# Patient Record
Sex: FEMALE | Race: WHITE | Employment: OTHER | URBAN - NONMETROPOLITAN AREA
[De-identification: names, ages, dates, MRNs, and addresses within clinical notes are randomized per-mention and may not be internally consistent; named-entity substitution may affect disease eponyms.]

---

## 2017-01-03 ENCOUNTER — TREATMENT (OUTPATIENT)
Dept: PHYSICAL THERAPY | Age: 62
End: 2017-01-03

## 2017-01-03 DIAGNOSIS — M17.12 PRIMARY OSTEOARTHRITIS OF ONE KNEE, LEFT: Primary | ICD-10-CM

## 2017-01-03 PROCEDURE — 97014 ELECTRIC STIMULATION THERAPY: CPT

## 2017-01-03 PROCEDURE — 97110 THERAPEUTIC EXERCISES: CPT

## 2017-01-05 ENCOUNTER — TREATMENT (OUTPATIENT)
Dept: PHYSICAL THERAPY | Age: 62
End: 2017-01-05

## 2017-01-05 DIAGNOSIS — M17.12 PRIMARY OSTEOARTHRITIS OF ONE KNEE, LEFT: Primary | ICD-10-CM

## 2017-01-05 PROCEDURE — 97014 ELECTRIC STIMULATION THERAPY: CPT | Performed by: PHYSICAL THERAPIST

## 2017-01-05 PROCEDURE — 97110 THERAPEUTIC EXERCISES: CPT | Performed by: PHYSICAL THERAPIST

## 2017-01-09 ENCOUNTER — TREATMENT (OUTPATIENT)
Dept: PHYSICAL THERAPY | Age: 62
End: 2017-01-09

## 2017-01-09 DIAGNOSIS — M17.12 PRIMARY OSTEOARTHRITIS OF ONE KNEE, LEFT: Primary | ICD-10-CM

## 2017-01-09 PROCEDURE — 97110 THERAPEUTIC EXERCISES: CPT

## 2017-01-09 PROCEDURE — 97014 ELECTRIC STIMULATION THERAPY: CPT

## 2017-01-12 ENCOUNTER — TREATMENT (OUTPATIENT)
Dept: PHYSICAL THERAPY | Age: 62
End: 2017-01-12

## 2017-01-12 DIAGNOSIS — M17.12 PRIMARY OSTEOARTHRITIS OF ONE KNEE, LEFT: Primary | ICD-10-CM

## 2017-01-12 PROCEDURE — 97014 ELECTRIC STIMULATION THERAPY: CPT

## 2017-01-12 PROCEDURE — 97110 THERAPEUTIC EXERCISES: CPT

## 2017-01-16 ENCOUNTER — TREATMENT (OUTPATIENT)
Dept: PHYSICAL THERAPY | Age: 62
End: 2017-01-16

## 2017-01-16 DIAGNOSIS — M17.12 PRIMARY OSTEOARTHRITIS OF ONE KNEE, LEFT: Primary | ICD-10-CM

## 2017-01-16 PROCEDURE — 97014 ELECTRIC STIMULATION THERAPY: CPT

## 2017-01-16 PROCEDURE — 97110 THERAPEUTIC EXERCISES: CPT

## 2017-01-19 ENCOUNTER — TREATMENT (OUTPATIENT)
Dept: PHYSICAL THERAPY | Age: 62
End: 2017-01-19

## 2017-01-19 DIAGNOSIS — M17.12 PRIMARY OSTEOARTHRITIS OF ONE KNEE, LEFT: Primary | ICD-10-CM

## 2017-01-19 PROCEDURE — 97014 ELECTRIC STIMULATION THERAPY: CPT | Performed by: PHYSICAL THERAPIST

## 2017-01-19 PROCEDURE — 97110 THERAPEUTIC EXERCISES: CPT | Performed by: PHYSICAL THERAPIST

## 2017-01-26 ENCOUNTER — OFFICE VISIT (OUTPATIENT)
Dept: OPHTHALMOLOGY | Age: 62
End: 2017-01-26

## 2017-01-26 DIAGNOSIS — H40.10X1 COAG (CHRONIC OPEN-ANGLE GLAUCOMA), MILD STAGE: Primary | ICD-10-CM

## 2017-01-26 PROCEDURE — 92083 EXTENDED VISUAL FIELD XM: CPT | Performed by: OPHTHALMOLOGY

## 2017-01-26 PROCEDURE — 92133 CPTRZD OPH DX IMG PST SGM ON: CPT | Performed by: OPHTHALMOLOGY

## 2017-01-26 RX ORDER — TROPICAMIDE 10 MG/ML
1 SOLUTION/ DROPS OPHTHALMIC ONCE
Status: COMPLETED | OUTPATIENT
Start: 2017-01-26 | End: 2017-01-26

## 2017-01-26 RX ORDER — PHENYLEPHRINE HCL 2.5 %
1 DROPS OPHTHALMIC (EYE) ONCE
Status: COMPLETED | OUTPATIENT
Start: 2017-01-26 | End: 2017-01-26

## 2017-01-26 RX ADMIN — Medication 1 DROP: at 15:38

## 2017-01-26 RX ADMIN — TROPICAMIDE 1 DROP: 10 SOLUTION/ DROPS OPHTHALMIC at 15:38

## 2017-02-09 ENCOUNTER — OFFICE VISIT (OUTPATIENT)
Dept: OPHTHALMOLOGY | Age: 62
End: 2017-02-09

## 2017-02-09 DIAGNOSIS — H40.1191 PRIMARY OPEN-ANGLE GLAUCOMA, MILD STAGE, UNSPECIFIED LATERALITY: Primary | ICD-10-CM

## 2017-02-09 PROCEDURE — 99214 OFFICE O/P EST MOD 30 MIN: CPT | Performed by: OPHTHALMOLOGY

## 2017-02-09 RX ORDER — TROPICAMIDE 10 MG/ML
1 SOLUTION/ DROPS OPHTHALMIC ONCE
Status: COMPLETED | OUTPATIENT
Start: 2017-02-09 | End: 2017-02-09

## 2017-02-09 RX ORDER — PHENYLEPHRINE HCL 2.5 %
1 DROPS OPHTHALMIC (EYE) ONCE
Status: COMPLETED | OUTPATIENT
Start: 2017-02-09 | End: 2017-02-09

## 2017-02-09 RX ORDER — BENOXINATE HCL/FLUORESCEIN SOD 0.4%-0.25%
1 DROPS OPHTHALMIC (EYE) ONCE
Status: COMPLETED | OUTPATIENT
Start: 2017-02-09 | End: 2017-02-09

## 2017-02-09 RX ADMIN — Medication 1 DROP: at 15:35

## 2017-02-09 RX ADMIN — TROPICAMIDE 1 DROP: 10 SOLUTION/ DROPS OPHTHALMIC at 15:35

## 2017-02-09 ASSESSMENT — SLIT LAMP EXAM - LIDS
COMMENTS: NORMAL
COMMENTS: NORMAL

## 2017-02-09 ASSESSMENT — TONOMETRY
OD_IOP_MMHG: 18
IOP_METHOD: APPLANATION W FLURESS DROP
OS_IOP_MMHG: 18

## 2017-02-09 ASSESSMENT — VISUAL ACUITY
METHOD: SNELLEN - LINEAR
OS_CC: 20/25
CORRECTION_TYPE: GLASSES

## 2017-02-09 ASSESSMENT — ENCOUNTER SYMPTOMS
RESPIRATORY NEGATIVE: 1
GASTROINTESTINAL NEGATIVE: 1

## 2017-03-08 ENCOUNTER — TELEPHONE (OUTPATIENT)
Dept: SURGERY | Age: 62
End: 2017-03-08

## 2017-03-10 ENCOUNTER — OFFICE VISIT (OUTPATIENT)
Dept: INTERNAL MEDICINE | Age: 62
End: 2017-03-10

## 2017-03-10 VITALS
WEIGHT: 229 LBS | SYSTOLIC BLOOD PRESSURE: 118 MMHG | HEART RATE: 72 BPM | DIASTOLIC BLOOD PRESSURE: 70 MMHG | BODY MASS INDEX: 33.92 KG/M2 | HEIGHT: 69 IN

## 2017-03-10 DIAGNOSIS — I34.0 MITRAL VALVE INSUFFICIENCY, UNSPECIFIED ETIOLOGY: ICD-10-CM

## 2017-03-10 DIAGNOSIS — M54.50 CHRONIC RIGHT-SIDED LOW BACK PAIN WITHOUT SCIATICA: ICD-10-CM

## 2017-03-10 DIAGNOSIS — I10 ESSENTIAL HYPERTENSION: Primary | ICD-10-CM

## 2017-03-10 DIAGNOSIS — G89.29 CHRONIC RIGHT-SIDED LOW BACK PAIN WITHOUT SCIATICA: ICD-10-CM

## 2017-03-10 DIAGNOSIS — M17.12 PRIMARY OSTEOARTHRITIS OF ONE KNEE, LEFT: ICD-10-CM

## 2017-03-10 DIAGNOSIS — R73.01 IFG (IMPAIRED FASTING GLUCOSE): ICD-10-CM

## 2017-03-10 DIAGNOSIS — E78.5 DYSLIPIDEMIA: ICD-10-CM

## 2017-03-10 DIAGNOSIS — I82.409 DEEP VEIN THROMBOSIS (DVT) OF LOWER EXTREMITY, UNSPECIFIED CHRONICITY, UNSPECIFIED LATERALITY, UNSPECIFIED VEIN (HCC): ICD-10-CM

## 2017-03-10 PROCEDURE — 99214 OFFICE O/P EST MOD 30 MIN: CPT | Performed by: INTERNAL MEDICINE

## 2017-03-10 RX ORDER — CELECOXIB 200 MG/1
200 CAPSULE ORAL DAILY PRN
COMMUNITY
Start: 2017-01-17 | End: 2018-01-04

## 2017-03-12 ASSESSMENT — ENCOUNTER SYMPTOMS
ABDOMINAL PAIN: 0
BACK PAIN: 1
EYE PAIN: 0
SHORTNESS OF BREATH: 0
SORE THROAT: 0
DIARRHEA: 0
COUGH: 0
EYE DISCHARGE: 0
VOMITING: 0
BLURRED VISION: 0
DOUBLE VISION: 0
WHEEZING: 0
CONSTIPATION: 0
BLOOD IN STOOL: 0
NAUSEA: 0

## 2017-05-15 ENCOUNTER — OFFICE VISIT (OUTPATIENT)
Dept: INTERNAL MEDICINE | Age: 62
End: 2017-05-15
Payer: COMMERCIAL

## 2017-05-15 DIAGNOSIS — Z01.818 PRE-OP EVALUATION: ICD-10-CM

## 2017-05-15 DIAGNOSIS — I10 ESSENTIAL HYPERTENSION: ICD-10-CM

## 2017-05-15 DIAGNOSIS — I82.409 DEEP VEIN THROMBOSIS (DVT) OF LOWER EXTREMITY, UNSPECIFIED CHRONICITY, UNSPECIFIED LATERALITY, UNSPECIFIED VEIN (HCC): ICD-10-CM

## 2017-05-15 DIAGNOSIS — S83.209A MENISCUS TEAR: ICD-10-CM

## 2017-05-15 DIAGNOSIS — M17.12 PRIMARY OSTEOARTHRITIS OF ONE KNEE, LEFT: Primary | ICD-10-CM

## 2017-05-15 DIAGNOSIS — K21.9 GASTROESOPHAGEAL REFLUX DISEASE WITHOUT ESOPHAGITIS: ICD-10-CM

## 2017-05-15 DIAGNOSIS — E78.5 DYSLIPIDEMIA: ICD-10-CM

## 2017-05-15 DIAGNOSIS — R73.01 IFG (IMPAIRED FASTING GLUCOSE): ICD-10-CM

## 2017-05-15 PROCEDURE — 99214 OFFICE O/P EST MOD 30 MIN: CPT | Performed by: NURSE PRACTITIONER

## 2017-05-15 PROCEDURE — 93000 ELECTROCARDIOGRAM COMPLETE: CPT | Performed by: NURSE PRACTITIONER

## 2017-05-17 VITALS
OXYGEN SATURATION: 100 % | WEIGHT: 225 LBS | DIASTOLIC BLOOD PRESSURE: 80 MMHG | HEIGHT: 69 IN | TEMPERATURE: 98.6 F | RESPIRATION RATE: 16 BRPM | HEART RATE: 90 BPM | SYSTOLIC BLOOD PRESSURE: 126 MMHG | BODY MASS INDEX: 33.33 KG/M2

## 2017-05-18 ASSESSMENT — ENCOUNTER SYMPTOMS
BLOOD IN STOOL: 0
NAUSEA: 0
SORE THROAT: 0
TROUBLE SWALLOWING: 0
DIARRHEA: 0
EYE PAIN: 0
COUGH: 0
RHINORRHEA: 0
COLOR CHANGE: 0
VOMITING: 0
CHEST TIGHTNESS: 0
SINUS PRESSURE: 0
CONSTIPATION: 0
SHORTNESS OF BREATH: 0
ABDOMINAL PAIN: 0

## 2017-06-09 ENCOUNTER — OFFICE VISIT (OUTPATIENT)
Dept: OPHTHALMOLOGY | Age: 62
End: 2017-06-09
Payer: COMMERCIAL

## 2017-06-09 DIAGNOSIS — H40.1191 PRIMARY OPEN-ANGLE GLAUCOMA, MILD STAGE, UNSPECIFIED LATERALITY: Primary | ICD-10-CM

## 2017-06-09 PROCEDURE — 99213 OFFICE O/P EST LOW 20 MIN: CPT | Performed by: OPHTHALMOLOGY

## 2017-06-09 RX ORDER — BENOXINATE HCL/FLUORESCEIN SOD 0.4%-0.25%
1 DROPS OPHTHALMIC (EYE) ONCE
Status: COMPLETED | OUTPATIENT
Start: 2017-06-09 | End: 2017-06-09

## 2017-06-09 RX ORDER — LATANOPROST 50 UG/ML
1 SOLUTION/ DROPS OPHTHALMIC
Qty: 3 BOTTLE | Refills: 3 | Status: CANCELLED | OUTPATIENT
Start: 2017-06-09 | End: 2017-09-07

## 2017-06-09 RX ADMIN — Medication 1 DROP: at 15:47

## 2017-06-09 ASSESSMENT — ENCOUNTER SYMPTOMS
RESPIRATORY NEGATIVE: 1
GASTROINTESTINAL NEGATIVE: 1

## 2017-06-09 ASSESSMENT — VISUAL ACUITY
CORRECTION_TYPE: GLASSES
OS_CC: 20/20
METHOD: SNELLEN - LINEAR

## 2017-06-09 ASSESSMENT — SLIT LAMP EXAM - LIDS
COMMENTS: NORMAL
COMMENTS: NORMAL

## 2017-06-09 ASSESSMENT — TONOMETRY
OD_IOP_MMHG: 26
OS_IOP_MMHG: 24
IOP_METHOD: APPLANATION W FLURESS DROP

## 2017-06-12 ENCOUNTER — OFFICE VISIT (OUTPATIENT)
Dept: OPHTHALMOLOGY | Age: 62
End: 2017-06-12
Payer: COMMERCIAL

## 2017-06-12 DIAGNOSIS — H40.1191 PRIMARY OPEN-ANGLE GLAUCOMA, MILD STAGE, UNSPECIFIED LATERALITY: Primary | ICD-10-CM

## 2017-06-12 PROCEDURE — 99213 OFFICE O/P EST LOW 20 MIN: CPT | Performed by: OPHTHALMOLOGY

## 2017-06-12 RX ORDER — BENOXINATE HCL/FLUORESCEIN SOD 0.4%-0.25%
1 DROPS OPHTHALMIC (EYE) ONCE
Status: COMPLETED | OUTPATIENT
Start: 2017-06-12 | End: 2017-06-12

## 2017-06-12 RX ORDER — LATANOPROST 50 UG/ML
1 SOLUTION/ DROPS OPHTHALMIC NIGHTLY
Qty: 3 BOTTLE | Refills: 3 | Status: SHIPPED | OUTPATIENT
Start: 2017-06-12 | End: 2018-06-18

## 2017-06-12 RX ADMIN — Medication 1 DROP: at 16:35

## 2017-06-12 ASSESSMENT — ENCOUNTER SYMPTOMS
GASTROINTESTINAL NEGATIVE: 1
RESPIRATORY NEGATIVE: 1

## 2017-06-12 ASSESSMENT — SLIT LAMP EXAM - LIDS
COMMENTS: NORMAL
COMMENTS: NORMAL

## 2017-06-12 ASSESSMENT — TONOMETRY
OS_IOP_MMHG: 18
IOP_METHOD: APPLANATION W FLURESS DROP
OD_IOP_MMHG: 18

## 2017-06-12 ASSESSMENT — VISUAL ACUITY
CORRECTION_TYPE: GLASSES
OS_CC: 20/20
METHOD: SNELLEN - LINEAR

## 2017-07-03 ENCOUNTER — TELEPHONE (OUTPATIENT)
Dept: GENERAL RADIOLOGY | Age: 62
End: 2017-07-03

## 2017-07-05 DIAGNOSIS — Z12.31 SCREENING MAMMOGRAM, ENCOUNTER FOR: Primary | ICD-10-CM

## 2017-07-11 ENCOUNTER — HOSPITAL ENCOUNTER (OUTPATIENT)
Age: 62
Setting detail: SPECIMEN
Discharge: HOME | End: 2017-07-11
Payer: COMMERCIAL

## 2017-07-11 ENCOUNTER — OFFICE VISIT (OUTPATIENT)
Dept: OBGYN | Age: 62
End: 2017-07-11
Payer: COMMERCIAL

## 2017-07-11 VITALS
BODY MASS INDEX: 33.36 KG/M2 | HEART RATE: 100 BPM | WEIGHT: 225.2 LBS | HEIGHT: 69 IN | DIASTOLIC BLOOD PRESSURE: 100 MMHG | SYSTOLIC BLOOD PRESSURE: 152 MMHG

## 2017-07-11 DIAGNOSIS — Z12.72 VAGINAL PAP SMEAR: ICD-10-CM

## 2017-07-11 DIAGNOSIS — Z01.419 WELL FEMALE EXAM WITH ROUTINE GYNECOLOGICAL EXAM: Primary | ICD-10-CM

## 2017-07-11 DIAGNOSIS — Z12.31 SCREENING MAMMOGRAM, ENCOUNTER FOR: ICD-10-CM

## 2017-07-11 PROCEDURE — 99396 PREV VISIT EST AGE 40-64: CPT | Performed by: NURSE PRACTITIONER

## 2017-07-17 LAB — CYTOLOGY REPORT: NORMAL

## 2017-09-05 ENCOUNTER — HOSPITAL ENCOUNTER (OUTPATIENT)
Dept: LAB | Age: 62
Discharge: HOME | End: 2017-09-05
Payer: COMMERCIAL

## 2017-09-05 DIAGNOSIS — I10 ESSENTIAL HYPERTENSION: ICD-10-CM

## 2017-09-05 LAB
ANION GAP SERPL CALCULATED.3IONS-SCNC: 11 MMOL/L (ref 9–17)
BUN BLDV-MCNC: 17 MG/DL (ref 8–23)
BUN/CREAT BLD: 20 (ref 9–20)
CALCIUM SERPL-MCNC: 9 MG/DL (ref 8.6–10.4)
CHLORIDE BLD-SCNC: 105 MMOL/L (ref 98–107)
CO2: 26 MMOL/L (ref 20–31)
CREAT SERPL-MCNC: 0.85 MG/DL (ref 0.5–0.9)
GFR AFRICAN AMERICAN: >60 ML/MIN
GFR NON-AFRICAN AMERICAN: >60 ML/MIN
GFR SERPL CREATININE-BSD FRML MDRD: ABNORMAL ML/MIN/{1.73_M2}
GFR SERPL CREATININE-BSD FRML MDRD: ABNORMAL ML/MIN/{1.73_M2}
GLUCOSE BLD-MCNC: 105 MG/DL (ref 70–99)
POTASSIUM SERPL-SCNC: 4.2 MMOL/L (ref 3.7–5.3)
SODIUM BLD-SCNC: 142 MMOL/L (ref 135–144)

## 2017-09-05 PROCEDURE — 80048 BASIC METABOLIC PNL TOTAL CA: CPT

## 2017-09-05 PROCEDURE — 36415 COLL VENOUS BLD VENIPUNCTURE: CPT

## 2017-09-13 ENCOUNTER — HOSPITAL ENCOUNTER (OUTPATIENT)
Dept: LAB | Age: 62
Discharge: HOME | End: 2017-09-13
Payer: COMMERCIAL

## 2017-09-13 ENCOUNTER — OFFICE VISIT (OUTPATIENT)
Dept: INTERNAL MEDICINE | Age: 62
End: 2017-09-13
Payer: COMMERCIAL

## 2017-09-13 VITALS
SYSTOLIC BLOOD PRESSURE: 126 MMHG | HEIGHT: 69 IN | DIASTOLIC BLOOD PRESSURE: 68 MMHG | HEART RATE: 72 BPM | WEIGHT: 229 LBS | BODY MASS INDEX: 33.92 KG/M2

## 2017-09-13 DIAGNOSIS — R73.01 IFG (IMPAIRED FASTING GLUCOSE): ICD-10-CM

## 2017-09-13 DIAGNOSIS — M17.12 PRIMARY OSTEOARTHRITIS OF ONE KNEE, LEFT: ICD-10-CM

## 2017-09-13 DIAGNOSIS — N20.0 KIDNEY STONE: ICD-10-CM

## 2017-09-13 DIAGNOSIS — K21.00 GASTROESOPHAGEAL REFLUX DISEASE WITH ESOPHAGITIS: ICD-10-CM

## 2017-09-13 DIAGNOSIS — I10 ESSENTIAL HYPERTENSION: Primary | ICD-10-CM

## 2017-09-13 DIAGNOSIS — E78.5 DYSLIPIDEMIA: ICD-10-CM

## 2017-09-13 DIAGNOSIS — E66.9 OBESITY (BMI 30-39.9): ICD-10-CM

## 2017-09-13 DIAGNOSIS — I82.409 DEEP VEIN THROMBOSIS (DVT) OF LOWER EXTREMITY, UNSPECIFIED CHRONICITY, UNSPECIFIED LATERALITY, UNSPECIFIED VEIN (HCC): ICD-10-CM

## 2017-09-13 DIAGNOSIS — I34.0 MITRAL VALVE INSUFFICIENCY, UNSPECIFIED ETIOLOGY: ICD-10-CM

## 2017-09-13 LAB
-: ABNORMAL
AMORPHOUS: ABNORMAL
BACTERIA: ABNORMAL
BILIRUBIN URINE: NEGATIVE
CASTS UA: ABNORMAL /LPF (ref 0–2)
COLOR: ABNORMAL
COMMENT UA: ABNORMAL
CRYSTALS, UA: ABNORMAL /HPF
EPITHELIAL CELLS UA: ABNORMAL /HPF (ref 0–5)
GLUCOSE URINE: NEGATIVE
KETONES, URINE: NEGATIVE
LEUKOCYTE ESTERASE, URINE: NEGATIVE
MUCUS: ABNORMAL
NITRITE, URINE: NEGATIVE
OTHER OBSERVATIONS UA: ABNORMAL
PH UA: 6 (ref 5–6)
PROTEIN UA: NEGATIVE
RBC UA: ABNORMAL /HPF (ref 0–4)
RENAL EPITHELIAL, UA: ABNORMAL /HPF
SPECIFIC GRAVITY UA: 1.02 (ref 1.01–1.02)
TRICHOMONAS: ABNORMAL
TURBIDITY: ABNORMAL
URINE HGB: ABNORMAL
UROBILINOGEN, URINE: NORMAL
WBC UA: ABNORMAL /HPF (ref 0–4)
YEAST: ABNORMAL

## 2017-09-13 PROCEDURE — 81001 URINALYSIS AUTO W/SCOPE: CPT

## 2017-09-13 PROCEDURE — 99214 OFFICE O/P EST MOD 30 MIN: CPT | Performed by: INTERNAL MEDICINE

## 2017-09-13 ASSESSMENT — PATIENT HEALTH QUESTIONNAIRE - PHQ9
SUM OF ALL RESPONSES TO PHQ9 QUESTIONS 1 & 2: 0
1. LITTLE INTEREST OR PLEASURE IN DOING THINGS: 0
SUM OF ALL RESPONSES TO PHQ QUESTIONS 1-9: 0
2. FEELING DOWN, DEPRESSED OR HOPELESS: 0

## 2017-09-14 ENCOUNTER — HOSPITAL ENCOUNTER (OUTPATIENT)
Dept: ULTRASOUND IMAGING | Age: 62
Discharge: HOME | End: 2017-09-14
Payer: COMMERCIAL

## 2017-09-14 DIAGNOSIS — N20.0 KIDNEY STONE: ICD-10-CM

## 2017-09-14 PROCEDURE — 76775 US EXAM ABDO BACK WALL LIM: CPT

## 2017-10-13 ENCOUNTER — OFFICE VISIT (OUTPATIENT)
Dept: OPHTHALMOLOGY | Age: 62
End: 2017-10-13
Payer: COMMERCIAL

## 2017-10-13 DIAGNOSIS — H40.9 MILD STAGE GLAUCOMA(365.71): Primary | ICD-10-CM

## 2017-10-13 PROCEDURE — 99213 OFFICE O/P EST LOW 20 MIN: CPT | Performed by: OPHTHALMOLOGY

## 2017-10-13 RX ORDER — BENOXINATE HCL/FLUORESCEIN SOD 0.4%-0.25%
1 DROPS OPHTHALMIC (EYE) ONCE
Status: COMPLETED | OUTPATIENT
Start: 2017-10-13 | End: 2017-10-13

## 2017-10-13 RX ADMIN — Medication 1 DROP: at 14:43

## 2017-10-13 ASSESSMENT — VISUAL ACUITY
CORRECTION_TYPE: GLASSES
OS_CC: 20/20
METHOD: SNELLEN - LINEAR

## 2017-10-13 ASSESSMENT — TONOMETRY
OS_IOP_MMHG: 20
OD_IOP_MMHG: 20
IOP_METHOD: APPLANATION W FLURESS DROP

## 2017-10-13 ASSESSMENT — ENCOUNTER SYMPTOMS
GASTROINTESTINAL NEGATIVE: 1
RESPIRATORY NEGATIVE: 1

## 2017-10-13 ASSESSMENT — SLIT LAMP EXAM - LIDS
COMMENTS: NORMAL
COMMENTS: NORMAL

## 2017-10-13 NOTE — PATIENT INSTRUCTIONS
Take medication as prescribed. If you have any problems or concerns before your next scheduled appointment, please call the office at [phone].

## 2017-10-13 NOTE — PROGRESS NOTES
Kade Li presents today for an IOP check   Chief Complaint   Patient presents with    Follow-up    Glaucoma   . HPI     Glaucoma   In both eyes. Side effects of treatment include none. Treatment compliance is always. Comments   4 month IOP ck  Latanoprost 1 drop OU at bedtime,  Last drop OU last night  No problems with drop  No changes in health status since last appointment with me. Having no problems with any other organ system. I have reviewed the HPI I agree and will use it for the pt. HPI. Review of Systems  Review of Systems   Constitutional: Negative. HENT: Negative. Respiratory: Negative. Cardiovascular: Negative. Gastrointestinal: Negative. Musculoskeletal: Negative. Skin: Negative. Neurological: Negative. Endo/Heme/Allergies: Negative. Main Ophthalmology Exam     External Exam       Right Left    External Normal Normal          Slit Lamp Exam       Right Left    Lids/Lashes Normal Normal    Conjunctiva/Sclera White and quiet White and quiet    Cornea Clear Clear    Anterior Chamber Deep and quiet Deep and quiet    Iris Round and reactive Round and reactive    Lens Trace Nuclear sclerosis, Trace Cortical cataract Trace Nuclear sclerosis, Trace Cortical cataract    Vitreous Normal Normal                   Tonometry     Tonometry (Applanation w Fluress drop, 2:38 PM)       Right Left    Pressure 20 20              Visual Acuity     Visual Acuity (Snellen - Linear)       Right Left    Dist cc 20/20 20/20    Correction:  Glasses              Pupils     Pupils       Pupils React APD    Right PERRL Slow None    Left PERRL Slow None                Not recorded         Extraocular Movement     Extraocular Movement       Right Left     Full, Ortho Full, Ortho                IMPRESSION:  1. Mild stage glaucoma         PLAN:  Discussed all below with patient.     1. Mild stage glaucoma  Lumigan 0.01% 1 gtt OU hs       Patient Instructions   Take medication as prescribed. If you have any problems or concerns before your next scheduled appointment, please call the office at [phone]. Return in about 4 months (around 2/13/2018) for dilation & repeat OCT,VF.

## 2017-11-09 ENCOUNTER — HOSPITAL ENCOUNTER (OUTPATIENT)
Dept: LAB | Age: 62
Setting detail: SPECIMEN
Discharge: HOME | End: 2017-11-09
Payer: COMMERCIAL

## 2017-11-09 ENCOUNTER — OFFICE VISIT (OUTPATIENT)
Dept: INTERNAL MEDICINE | Age: 62
End: 2017-11-09
Payer: COMMERCIAL

## 2017-11-09 VITALS
BODY MASS INDEX: 34.42 KG/M2 | HEIGHT: 69 IN | TEMPERATURE: 97.8 F | DIASTOLIC BLOOD PRESSURE: 86 MMHG | OXYGEN SATURATION: 99 % | WEIGHT: 232.4 LBS | HEART RATE: 86 BPM | RESPIRATION RATE: 16 BRPM | SYSTOLIC BLOOD PRESSURE: 132 MMHG

## 2017-11-09 DIAGNOSIS — Z01.818 PRE-OP EVALUATION: ICD-10-CM

## 2017-11-09 DIAGNOSIS — R73.01 IFG (IMPAIRED FASTING GLUCOSE): ICD-10-CM

## 2017-11-09 DIAGNOSIS — K21.00 GASTROESOPHAGEAL REFLUX DISEASE WITH ESOPHAGITIS: ICD-10-CM

## 2017-11-09 DIAGNOSIS — I34.0 MITRAL VALVE INSUFFICIENCY, UNSPECIFIED ETIOLOGY: ICD-10-CM

## 2017-11-09 DIAGNOSIS — I82.409 DEEP VEIN THROMBOSIS (DVT) OF LOWER EXTREMITY, UNSPECIFIED CHRONICITY, UNSPECIFIED LATERALITY, UNSPECIFIED VEIN (HCC): ICD-10-CM

## 2017-11-09 DIAGNOSIS — M17.12 PRIMARY OSTEOARTHRITIS OF LEFT KNEE: Primary | ICD-10-CM

## 2017-11-09 DIAGNOSIS — I10 ESSENTIAL HYPERTENSION: ICD-10-CM

## 2017-11-09 DIAGNOSIS — E78.5 DYSLIPIDEMIA: ICD-10-CM

## 2017-11-09 LAB
HCT VFR BLD CALC: 37.2 % (ref 36–46)
HEMOGLOBIN: 12.5 G/DL (ref 12–16)
MCH RBC QN AUTO: 30.9 PG (ref 26–34)
MCHC RBC AUTO-ENTMCNC: 33.6 G/DL (ref 31–37)
MCV RBC AUTO: 92.1 FL (ref 80–100)
PDW BLD-RTO: 13.1 % (ref 11–14.5)
PLATELET # BLD: 301 K/UL (ref 140–450)
PMV BLD AUTO: 8.5 FL (ref 6–12)
RBC # BLD: 4.04 M/UL (ref 4–5.2)
WBC # BLD: 11.4 K/UL (ref 3.5–11)

## 2017-11-09 PROCEDURE — 93000 ELECTROCARDIOGRAM COMPLETE: CPT | Performed by: NURSE PRACTITIONER

## 2017-11-09 PROCEDURE — 99214 OFFICE O/P EST MOD 30 MIN: CPT | Performed by: NURSE PRACTITIONER

## 2017-11-09 PROCEDURE — 36415 COLL VENOUS BLD VENIPUNCTURE: CPT

## 2017-11-09 PROCEDURE — 85027 COMPLETE CBC AUTOMATED: CPT

## 2017-11-10 ASSESSMENT — ENCOUNTER SYMPTOMS
EYE REDNESS: 0
SPUTUM PRODUCTION: 0
NAUSEA: 0
BACK PAIN: 0
SINUS PAIN: 0
COUGH: 0
BLOOD IN STOOL: 0
EYE PAIN: 0
SORE THROAT: 0
CONSTIPATION: 0
HEMOPTYSIS: 0
DIARRHEA: 0
SHORTNESS OF BREATH: 0
ORTHOPNEA: 0
WHEEZING: 0
ABDOMINAL PAIN: 0
VOMITING: 0
EYE DISCHARGE: 0

## 2017-11-10 NOTE — PROGRESS NOTES
11/09/17  Toy Javier Foor  1955      Chief Complaint: pre op eval   1. Primary osteoarthritis of left knee    2. Pre-op evaluation    3. Deep vein thrombosis (DVT) of lower extremity, unspecified chronicity, unspecified laterality, unspecified vein (HCC)    4. IFG (impaired fasting glucose)    5. Mitral valve insufficiency, unspecified etiology    6. Gastroesophageal reflux disease with esophagitis    7. Essential hypertension    8. Dyslipidemia        HPI:  Patient comes in for preop evaluation due to severe arthritis of left knee requiring replacement. Patient is set up with surgeon Tuesday for preoperative exam and to schedule surgery. Except for the knee pain she has been doing well. Denies any chest discomfort. No shortness of breath. Does have a history of multiple episodes of DVT on Xarelto daily lifelong. Impaired fasting glucose has been stable. History of mitral valve insufficiency. With last echo stable. Reflux stable on Prilosec. Hypertension stable on current antihypertensive regimen. No lightheadedness. No dizziness. No chest discomfort or shortness of breath. Continues on simvastatin for her dyslipidemia without myalgias. Overall patient has been feeling well. No recent illness. No questions regarding upcoming surgery. Allergies   Allergen Reactions    Lisinopril      cough    Mobic [Meloxicam]      Fatigue and dizziness    Pregabalin      Pt didn't like the way she felt on it    Sulfa Antibiotics     Ultram [Tramadol]      Not tolerated    Vicodin [Hydrocodone-Acetaminophen]      Poorly tolerated       Past Medical History:   Diagnosis Date    Allergic rhinitis     Cervical cancer ([facility] [address]     History of squamous cell carcinoma of the cervix    Chronic LBP     evaluated in past by Mikey Bush/Sis/Katiana/Alvaro, chiropractor.  1.) Evaluation with injections by Dr Cathi Mejia 2.) MRI, 11/08  3) MRI, 01/12 significant foraminal stenosis at L5-S1 on the right and stenosis of foramen, left greater than the right, at L4-L5. 4.) Dr Mortimer Koller evaluation, 2012. 5.) Dr Mau nieves with injection x1, benefitted her in 2012    CTS (carpal tunnel syndrome)     LEFT    Degeneration of lumbar intervertebral disc 01/10/2016    [facility] L2/3, L4/L5, L5/S1, rosalind injections, MRI 12/16  Dr Miramontes Fossa    Diverticulosis     on colonoscopy 08/11    DVT (deep venous thrombosis) ([facility] [address] 09/03    1.)Factor V Leiden mutation, heterozygous 2.) Hyperhomocysteinemia 3.) Dr Diana nieves recommending lifelong anticoagulation. 4.) Post-phlebitic syndrome on right. Dr Dagoberto nieves 2012 rec continued permanent anticoag.  Fatty infiltration of liver     Noted on ultrasound September 2014. Discussed weight loss.  GERD (gastroesophageal reflux disease)     EGD, 01/03, repeat egd egd 7/14 Dr Porsche Garcia. Esophagitis    Glaucoma     Dr Dorian Kruse Hemorrhoids     on colonoscopy 08/11    Hyperhomocysteinemia ([facility] [address]     Hyperlipidemia     Hypertension     1.) Admit to [facility], 05/05, with hypertensive urgency with some right facial tingling, negative CT, negative MRI, 2.) 24 hr urine cortisol normal, 02/07, 24 hour urine metanephrine normal, 02/07 3.) MRI of renal arteries okay, 05/08    Hypomagnesemia     IBS (irritable bowel syndrome)     history of    IFG (impaired fasting glucose)     Migraine headache     evaluated by Dr Santiago Wallace and Dr Herlinda Oneal in past. Failed Zomig, Imitrex, Maxalt, multiple NSAIDs, and Fioricet. Failed amitriptyline secondary to intolerance.     Mitral regurgitation     Mildly dilated left atrium and mild to moderate MR on echo, 01/11. 1.) Repeat echocardiogram, 06/12 with EF of 50-55%, left atrial enlargement, Mild MR   2) Echo 9/16  trivial MR, grade 1 allen dysfxn, RSVP 41    Obesity     Ptosis of right eyelid     MILD,CHRONIC    Sprain of lumbar region 1/10/2016    [facility]    Varicose veins     with sclerotherapy by Dr Jose Francisco Alvarado       Past Surgical History:   Procedure Laterality Date  APPENDECTOMY      COLONOSCOPY  8/19/2011    Mild sigmoid diverticulosis. Internal hemorrhoids. Multiple sentinel piles.  HEEL SPUR SURGERY Right 1990    HYSTERECTOMY, TOTAL ABDOMINAL  1986    had squamous cell carcinoma in situ    [facility]    OTHER SURGICAL HISTORY  5/23/13     Interlaminar epidural steroid injection L5-S1.    OTHER SURGICAL HISTORY  1/15/16    right L5 TFE    SPINE SURGERY  5/23/13    L5/S1 IESI    UPPER GASTROINTESTINAL ENDOSCOPY  1/31/2003    Tiny sliding hiatal hernia. Minimal antral erythema. No evidence of Franco's.  VARICOSE VEIN SURGERY Left 8-2013    laser ablation       Current Outpatient Prescriptions on File Prior to Visit   Medication Sig Dispense Refill    Elastic Bandages & Supports ([address] [facility]) [address] [facility] 1 each by Does not apply route daily as needed (venous insuffiency) 20-30 mmHg 2 each 0    celecoxib (CELEBREX) 200 MG capsule Take 200 mg by mouth daily as needed       amLODIPine (NORVASC) 10 MG tablet TAKE 1 TABLET DAILY 90 tablet 3    simvastatin (ZOCOR) 20 MG tablet TAKE 1 TABLET NIGHTLY 90 tablet 3    atenolol (TENORMIN) 100 MG tablet TAKE 1 TABLET DAILY 90 tablet 3    tiZANidine (ZANAFLEX) 2 MG capsule Take 1 capsule by mouth 3 times daily 90 capsule 1    losartan-hydrochlorothiazide (HYZAAR) 100-25 MG per tablet Take 1 tablet by mouth daily 90 tablet 3    rivaroxaban (XARELTO) 20 MG TABS tablet Take 1 tablet by mouth daily (with breakfast) 30 tablet 3    omeprazole (PRILOSEC) 20 MG capsule Take 20 mg by mouth as needed       folic acid (FOLVITE) 1 MG tablet Take 2 mg by mouth daily.  magnesium oxide (MAG-OX) 400 MG tablet Take 400 mg by mouth daily.  latanoprost (XALATAN) 0.005 % ophthalmic solution Place 1 drop into both eyes nightly 3 Bottle 3     No current facility-administered medications on file prior to visit.         Social History     Social History    Marital status:  Spouse name: N/A    Number of children: 2    Years of education: N/A     Occupational History     [employer]     Social History Main Topics    Smoking status: Never Smoker    Smokeless tobacco: Never Used    Alcohol use No    Drug use: No    Sexual activity: Not on file     Other Topics Concern    Not on file     Social History Narrative    No narrative on file       Review of Systems   Constitutional: Negative for chills, diaphoresis, fever, malaise/fatigue and weight loss. HENT: Negative for congestion, sinus pain and sore throat. Eyes: Negative for pain, discharge and redness. Respiratory: Negative for cough, hemoptysis, sputum production, shortness of breath and wheezing. Cardiovascular: Negative for chest pain, palpitations, orthopnea and leg swelling. Gastrointestinal: Negative for abdominal pain, blood in stool, constipation, diarrhea, nausea and vomiting. Genitourinary: Negative for dysuria, frequency and urgency. Musculoskeletal: Positive for joint pain. Negative for back pain, falls, myalgias and neck pain. Skin: Negative for rash. Neurological: Negative for dizziness, tremors, seizures and headaches. Psychiatric/Behavioral: Negative for depression, memory loss, substance abuse and suicidal ideas. The patient is not nervous/anxious. Physical Exam   Constitutional: She is oriented to person, place, and time and well-developed, well-nourished, and in no distress. No distress. HENT:   Head: Normocephalic and atraumatic. Right Ear: External ear normal.   Left Ear: External ear normal.   Eyes: Right eye exhibits no discharge. Left eye exhibits no discharge. Neck: Neck supple. No tracheal deviation present. Cardiovascular: Normal rate, regular rhythm and normal heart sounds. Exam reveals no gallop and no friction rub. No murmur heard. Pulmonary/Chest: Effort normal and breath sounds normal. No respiratory distress. She has no wheezes. She has no rales.  She exhibits no tenderness. Abdominal: Soft. Bowel sounds are normal. She exhibits no distension. There is no tenderness. Musculoskeletal: She exhibits no edema. Neurological: She is alert and oriented to person, place, and time. No cranial nerve deficit. Coordination normal.   Skin: Skin is warm and dry. No rash noted. She is not diaphoretic. Psychiatric: Mood, memory, affect and judgment normal.     Vitals:    11/09/17 1442   BP: 132/86   Site: Left Arm   Position: Sitting   Cuff Size: Large Adult   Pulse: 86   Resp: 16   Temp: 97.8 °F (36.6 °C)   TempSrc: Tympanic   SpO2: 99%   Weight: 232 lb 6.4 oz (105.4 kg)   Height: 5' 9\" (1.753 m)      Value Ref Range & Units Status Collected Lab   WBC 11.4   3.5 - 11.0 k/uL Final 11/09/2017  3:48 PM [facility]   RBC 4.04  4.0 - 5.2 m/uL Final 11/09/2017  3:48 PM [facility] [place]   Hemoglobin 12.5  12.0 - 16.0 g/dL Final 11/09/2017  3:48 PM [facility] [place]   Hematocrit 37.2  36 - 46 % Final 11/09/2017  3:48 PM [facility] [place]   MCV 92.1  80 - 100 fL Final 11/09/2017  3:48 PM [facility] [place]   MCH 30.9  26 - 34 pg Final 11/09/2017  3:48 PM [facility] [place]   MCHC 33.6  31 - 37 g/dL Final 11/09/2017  3:48 PM [facility] [place]   RDW 13.1  11.0 - 14.5 % Final 11/09/2017  3:48 PM [facility] [place]   Platelets 515  621 - 450 k/uL Final 11/09/2017  3:48 PM [facility]   MPV 8.5  6.0 - 12.0        EKG:Sinus  Rhythm   -  Nonspecific T-abnormality. No changes from previous EKG-         Assessment:  1. Primary osteoarthritis of left knee    Pre-op eval.  2. Pre-op evaluation    - EKG 12 Lead; Future  - CBC; Future    3. Deep vein thrombosis (DVT) of lower extremity, unspecified chronicity, unspecified laterality, unspecified vein (HCC)   will hold Xarelto 3 days prior to procedure and will need to be restarted after procedure    4. IFG (impaired fasting glucose)  Stable, continue to work on diet and increase activity    5.  Mitral valve insufficiency, unspecified etiology stable    6. Gastroesophageal reflux disease with esophagitis   stable continue to monitor diet    7. Essential hypertension   stable on current antihypertensive regimen    8. Dyslipidemia   stable on simvastatin      Plan:  As noted above. The patient is having further preop evaluation through [facility] internist due to them monitoring her hospital stay- will have them send further testing to office for further review   Follow up for routine visit. Call sooner with concerns prior.     Electronically signed by Pebbles Florentino CNP on 11/9/2017 at 9:57 PM

## 2018-01-03 ENCOUNTER — OFFICE VISIT (OUTPATIENT)
Dept: INTERNAL MEDICINE | Age: 63
End: 2018-01-03
Payer: COMMERCIAL

## 2018-01-03 ENCOUNTER — HOSPITAL ENCOUNTER (OUTPATIENT)
Dept: LAB | Age: 63
Setting detail: SPECIMEN
Discharge: HOME | End: 2018-01-03
Payer: COMMERCIAL

## 2018-01-03 VITALS
DIASTOLIC BLOOD PRESSURE: 80 MMHG | BODY MASS INDEX: 33.18 KG/M2 | RESPIRATION RATE: 18 BRPM | SYSTOLIC BLOOD PRESSURE: 132 MMHG | HEART RATE: 88 BPM | WEIGHT: 224 LBS | HEIGHT: 69 IN | TEMPERATURE: 99 F

## 2018-01-03 DIAGNOSIS — I82.409 DEEP VEIN THROMBOSIS (DVT) OF LOWER EXTREMITY, UNSPECIFIED CHRONICITY, UNSPECIFIED LATERALITY, UNSPECIFIED VEIN (HCC): ICD-10-CM

## 2018-01-03 DIAGNOSIS — I10 ESSENTIAL HYPERTENSION: ICD-10-CM

## 2018-01-03 DIAGNOSIS — M17.12 PRIMARY OSTEOARTHRITIS OF LEFT KNEE: Primary | ICD-10-CM

## 2018-01-03 DIAGNOSIS — D64.9 POSTOPERATIVE ANEMIA: ICD-10-CM

## 2018-01-03 DIAGNOSIS — E78.5 DYSLIPIDEMIA: ICD-10-CM

## 2018-01-03 DIAGNOSIS — R73.01 IFG (IMPAIRED FASTING GLUCOSE): ICD-10-CM

## 2018-01-03 DIAGNOSIS — N17.9 AKI (ACUTE KIDNEY INJURY) (HCC): ICD-10-CM

## 2018-01-03 DIAGNOSIS — K21.00 GASTROESOPHAGEAL REFLUX DISEASE WITH ESOPHAGITIS: ICD-10-CM

## 2018-01-03 DIAGNOSIS — E66.9 OBESITY (BMI 30-39.9): ICD-10-CM

## 2018-01-03 LAB
ABSOLUTE EOS #: 0.4 K/UL (ref 0–0.4)
ABSOLUTE IMMATURE GRANULOCYTE: ABNORMAL K/UL (ref 0–0.3)
ABSOLUTE LYMPH #: 2.6 K/UL (ref 1–4.8)
ABSOLUTE MONO #: 1.1 K/UL (ref 0.1–1.2)
ANION GAP SERPL CALCULATED.3IONS-SCNC: 17 MMOL/L (ref 9–17)
BASOPHILS # BLD: 1 % (ref 0–1)
BASOPHILS ABSOLUTE: 0.1 K/UL (ref 0–0.2)
BUN BLDV-MCNC: 19 MG/DL (ref 8–23)
BUN/CREAT BLD: 18 (ref 9–20)
CALCIUM SERPL-MCNC: 9.5 MG/DL (ref 8.6–10.4)
CHLORIDE BLD-SCNC: 99 MMOL/L (ref 98–107)
CO2: 25 MMOL/L (ref 20–31)
CREAT SERPL-MCNC: 1.06 MG/DL (ref 0.5–0.9)
DIFFERENTIAL TYPE: ABNORMAL
EOSINOPHILS RELATIVE PERCENT: 3 % (ref 1–7)
GFR AFRICAN AMERICAN: >60 ML/MIN
GFR NON-AFRICAN AMERICAN: 52 ML/MIN
GFR SERPL CREATININE-BSD FRML MDRD: ABNORMAL ML/MIN/{1.73_M2}
GFR SERPL CREATININE-BSD FRML MDRD: ABNORMAL ML/MIN/{1.73_M2}
GLUCOSE BLD-MCNC: 99 MG/DL (ref 70–99)
HCT VFR BLD CALC: 34.7 % (ref 36–46)
HEMOGLOBIN: 11.3 G/DL (ref 12–16)
IMMATURE GRANULOCYTES: ABNORMAL %
LYMPHOCYTES # BLD: 19 % (ref 16–46)
MCH RBC QN AUTO: 30.2 PG (ref 26–34)
MCHC RBC AUTO-ENTMCNC: 32.7 G/DL (ref 31–37)
MCV RBC AUTO: 92.5 FL (ref 80–100)
MONOCYTES # BLD: 8 % (ref 4–11)
PDW BLD-RTO: 13.6 % (ref 11–14.5)
PLATELET # BLD: 379 K/UL (ref 140–450)
PLATELET ESTIMATE: ABNORMAL
PMV BLD AUTO: 7.7 FL (ref 6–12)
POTASSIUM SERPL-SCNC: 4 MMOL/L (ref 3.7–5.3)
RBC # BLD: 3.75 M/UL (ref 4–5.2)
RBC # BLD: ABNORMAL 10*6/UL
SEG NEUTROPHILS: 69 % (ref 43–77)
SEGMENTED NEUTROPHILS ABSOLUTE COUNT: 9.7 K/UL (ref 1.8–7.7)
SODIUM BLD-SCNC: 141 MMOL/L (ref 135–144)
WBC # BLD: 13.8 K/UL (ref 3.5–11)
WBC # BLD: ABNORMAL 10*3/UL

## 2018-01-03 PROCEDURE — 80048 BASIC METABOLIC PNL TOTAL CA: CPT

## 2018-01-03 PROCEDURE — 36415 COLL VENOUS BLD VENIPUNCTURE: CPT

## 2018-01-03 PROCEDURE — 85025 COMPLETE CBC W/AUTO DIFF WBC: CPT

## 2018-01-03 PROCEDURE — 99214 OFFICE O/P EST MOD 30 MIN: CPT | Performed by: NURSE PRACTITIONER

## 2018-01-03 RX ORDER — OXYCODONE HYDROCHLORIDE AND ACETAMINOPHEN 5; 325 MG/1; MG/1
1 TABLET ORAL
COMMUNITY
Start: 2018-01-02 | End: 2018-07-12 | Stop reason: ALTCHOICE

## 2018-01-03 ASSESSMENT — ENCOUNTER SYMPTOMS
CONSTIPATION: 0
SPUTUM PRODUCTION: 0
DIARRHEA: 0
SORE THROAT: 0
EYE DISCHARGE: 0
COUGH: 0
WHEEZING: 0
ABDOMINAL PAIN: 0
VOMITING: 0
HEARTBURN: 0
NAUSEA: 0
BLOOD IN STOOL: 0
BACK PAIN: 0
EYE REDNESS: 0
EYE PAIN: 0
ORTHOPNEA: 0
SHORTNESS OF BREATH: 0

## 2018-01-03 NOTE — PROGRESS NOTES
Obesity (BMI 30-39. 9)  To need to work on diet and increase activity, limit portion sizes    6. IFG (impaired fasting glucose)  Stable    7. Deep vein thrombosis (DVT) of lower extremity, unspecified chronicity, unspecified laterality, unspecified vein (HCC)  On prophylactic Xarelto     8. OLIVIA (acute kidney injury) ([facility] [address]  - Basic Metabolic Panel; today     9. Postoperative anemia  - CBC Auto Differential; today       Plan:  As noted above. Hospital records were reviewed  Follow up for routine visit. Call sooner with concerns prior.     Electronically signed by Melani Lancaster CNP on 1/3/2018 at 1:18 PM

## 2018-01-04 DIAGNOSIS — N17.9 AKI (ACUTE KIDNEY INJURY) (HCC): Primary | ICD-10-CM

## 2018-01-10 ENCOUNTER — OFFICE VISIT (OUTPATIENT)
Dept: INTERNAL MEDICINE | Age: 63
End: 2018-01-10
Payer: COMMERCIAL

## 2018-01-10 VITALS
TEMPERATURE: 98.6 F | SYSTOLIC BLOOD PRESSURE: 138 MMHG | HEIGHT: 69 IN | HEART RATE: 106 BPM | DIASTOLIC BLOOD PRESSURE: 72 MMHG | RESPIRATION RATE: 16 BRPM | BODY MASS INDEX: 32.35 KG/M2 | OXYGEN SATURATION: 99 % | WEIGHT: 218.4 LBS

## 2018-01-10 DIAGNOSIS — J40 BRONCHITIS: Primary | ICD-10-CM

## 2018-01-10 PROCEDURE — 99213 OFFICE O/P EST LOW 20 MIN: CPT | Performed by: NURSE PRACTITIONER

## 2018-01-10 RX ORDER — AZITHROMYCIN 250 MG/1
TABLET, FILM COATED ORAL
Qty: 1 PACKET | Refills: 0 | Status: SHIPPED | OUTPATIENT
Start: 2018-01-10 | End: 2018-07-12 | Stop reason: ALTCHOICE

## 2018-01-10 ASSESSMENT — ENCOUNTER SYMPTOMS
COUGH: 1
VOMITING: 0
CONSTIPATION: 0
BLOOD IN STOOL: 0
STRIDOR: 0
SPUTUM PRODUCTION: 0
SHORTNESS OF BREATH: 1
NAUSEA: 0
HEARTBURN: 0
SINUS PAIN: 0
SORE THROAT: 0
EYE PAIN: 0
ABDOMINAL PAIN: 0
EYE DISCHARGE: 0
WHEEZING: 1
EYE REDNESS: 0
DIARRHEA: 0

## 2018-01-10 NOTE — PROGRESS NOTES
01/10/18  Rafael Genao  1955      Chief Complaint: fever, diarrhea, nausea    HPI:Pt comes in for complaints of elevated temperature- up to 100.6 last evening, occ chills, diarrhea, and nausea since yesterday- states diarrhea has subsided today- eas at PT today and began SOB with lightheadedness- pt contributes this to not eating or drinking much over the last 48 hours. No body aches- no fatigue. Recent L TKA. No redness/drainage/ swelling to knee. No CP or SOB at present       Allergies   Allergen Reactions    Lisinopril      cough    Mobic [Meloxicam]      Fatigue and dizziness    Pregabalin      Pt didn't like the way she felt on it    Sulfa Antibiotics     Ultram [Tramadol]      Not tolerated    Vicodin [Hydrocodone-Acetaminophen]      Poorly tolerated       Past Medical History:   Diagnosis Date    Allergic rhinitis     Cervical cancer ([facility]     History of squamous cell carcinoma of the cervix    Chronic LBP     evaluated in past by Mikey Bush/Sis/Katiana/Alvaro, chiropractor. 1.) Evaluation with injections by Dr Gabriella Reddy 2.) MRI, 11/08  3) MRI, 01/12 significant foraminal stenosis at L5-S1 on the right and stenosis of foramen, left greater than the right, at L4-L5. 4.) Dr Taina Daigle evaluation, 2012. 5.) Dr Gilles Lanes eval with injection x1, benefitted her in 2012    CTS (carpal tunnel syndrome)     LEFT    Degeneration of lumbar intervertebral disc 01/10/2016    [facility] L2/3, L4/L5, L5/S1, rosalind injections, MRI 12/16  Dr Martin Levine    Diverticulosis     on colonoscopy 08/11    DVT (deep venous thrombosis) ([facility] [address] 09/03    1.)Factor V Leiden mutation, heterozygous 2.) Hyperhomocysteinemia 3.) Dr Lona nieves recommending lifelong anticoagulation. 4.) Post-phlebitic syndrome on right. Dr Chidi nieves 2012 rec continued permanent anticoag.  Fatty infiltration of liver     Noted on ultrasound September 2014. Discussed weight loss.     GERD (gastroesophageal reflux disease)     EGD, 01/03, repeat egd Sig Dispense Refill    oxyCODONE-acetaminophen (PERCOCET) 5-325 MG per tablet Take 1 tablet by mouth.  Elastic Bandages & Supports (MEDICAL COMPRESSION STOCKINGS) MISC 1 each by Does not apply route daily as needed (venous insuffiency) 20-30 mmHg 2 each 0    simvastatin (ZOCOR) 20 MG tablet TAKE 1 TABLET NIGHTLY 90 tablet 3    atenolol (TENORMIN) 100 MG tablet TAKE 1 TABLET DAILY 90 tablet 3    tiZANidine (ZANAFLEX) 2 MG capsule Take 1 capsule by mouth 3 times daily 90 capsule 1    losartan-hydrochlorothiazide (HYZAAR) 100-25 MG per tablet Take 1 tablet by mouth daily 90 tablet 3    rivaroxaban (XARELTO) 20 MG TABS tablet Take 1 tablet by mouth daily (with breakfast) 30 tablet 3    omeprazole (PRILOSEC) 20 MG capsule Take 20 mg by mouth as needed       folic acid (FOLVITE) 1 MG tablet Take 2 mg by mouth daily.  magnesium oxide (MAG-OX) 400 MG tablet Take 400 mg by mouth daily.  latanoprost (XALATAN) 0.005 % ophthalmic solution Place 1 drop into both eyes nightly 3 Bottle 3     No current facility-administered medications on file prior to visit. Social History     Social History    Marital status:      Spouse name: N/A    Number of children: 2    Years of education: N/A     Occupational History     [employer]     Social History Main Topics    Smoking status: Never Smoker    Smokeless tobacco: Never Used    Alcohol use No    Drug use: No    Sexual activity: Not on file     Other Topics Concern    Not on file     Social History Narrative    No narrative on file       Review of Systems   Constitutional: Negative for chills and fever. HENT: Positive for congestion. Negative for ear discharge, ear pain, nosebleeds, sinus pain and sore throat. Eyes: Negative for pain, discharge and redness. Respiratory: Positive for cough (occ), shortness of breath and wheezing (occ). Negative for sputum production and stridor.     Cardiovascular: Negative for chest pain, palpitations and normal. She exhibits no distension. There is no tenderness. Musculoskeletal: She exhibits no edema. Neurological: She is alert and oriented to person, place, and time. No cranial nerve deficit. Gait normal. Coordination normal.   Skin: Skin is warm and dry. No rash noted. She is not diaphoretic. Psychiatric: Mood, memory, affect and judgment normal.   Nursing note and vitals reviewed. Vitals:    01/10/18 0938   BP: 138/72   Site: Left Arm   Position: Sitting   Cuff Size: Large Adult   Pulse: 106   Resp: 16   Temp: 98.6 °F (37 °C)   TempSrc: Tympanic   SpO2: 99%   Weight: 218 lb 6.4 oz (99.1 kg)   Height: 5' 9\" (1.753 m)       Assessment:  1. Bronchitis  - azithromycin (ZITHROMAX) 250 MG tablet; Take 2 tabs (500 mg) on Day 1, and take 1 tab (250 mg) on days 2 through 5. Dispense: 1 packet; Refill: 0    ATB as directed and until completed  Increase fluids  Increase rest  Tylenol as needed for general aches and pain  Robitussin as needed for cough  FU if worsens/persist       Plan:  As noted above. Follow up for routine visit. Call sooner with concerns prior.     Electronically signed by Charline Aleman CNP on 1/10/2018 at 2:11 PM

## 2018-01-11 ENCOUNTER — TELEPHONE (OUTPATIENT)
Dept: INTERNAL MEDICINE | Age: 63
End: 2018-01-11

## 2018-01-11 ENCOUNTER — HOSPITAL ENCOUNTER (OUTPATIENT)
Dept: LAB | Age: 63
Setting detail: SPECIMEN
Discharge: HOME | End: 2018-01-11
Payer: COMMERCIAL

## 2018-01-11 ENCOUNTER — HOSPITAL ENCOUNTER (OUTPATIENT)
Age: 63
Setting detail: SPECIMEN
Discharge: HOME | End: 2018-01-11
Payer: COMMERCIAL

## 2018-01-11 ENCOUNTER — HOSPITAL ENCOUNTER (OUTPATIENT)
Dept: GENERAL RADIOLOGY | Age: 63
Discharge: HOME | End: 2018-01-11
Payer: COMMERCIAL

## 2018-01-11 DIAGNOSIS — R06.09 DOE (DYSPNEA ON EXERTION): Primary | ICD-10-CM

## 2018-01-11 DIAGNOSIS — R06.09 DOE (DYSPNEA ON EXERTION): ICD-10-CM

## 2018-01-11 DIAGNOSIS — R05.9 COUGH: ICD-10-CM

## 2018-01-11 DIAGNOSIS — R50.9 FEVER, UNSPECIFIED FEVER CAUSE: Primary | ICD-10-CM

## 2018-01-11 DIAGNOSIS — R50.9 FEVER, UNSPECIFIED FEVER CAUSE: ICD-10-CM

## 2018-01-11 DIAGNOSIS — R05.9 COUGH: Primary | ICD-10-CM

## 2018-01-11 LAB
ANION GAP SERPL CALCULATED.3IONS-SCNC: 15 MMOL/L (ref 9–17)
BUN BLDV-MCNC: 16 MG/DL (ref 8–23)
BUN/CREAT BLD: 14 (ref 9–20)
CALCIUM SERPL-MCNC: 9.2 MG/DL (ref 8.6–10.4)
CHLORIDE BLD-SCNC: 100 MMOL/L (ref 98–107)
CO2: 24 MMOL/L (ref 20–31)
CREAT SERPL-MCNC: 1.14 MG/DL (ref 0.5–0.9)
D DIMER: 3210 NG/ML
DIRECT EXAM: NORMAL
GFR AFRICAN AMERICAN: 58 ML/MIN
GFR NON-AFRICAN AMERICAN: 48 ML/MIN
GFR SERPL CREATININE-BSD FRML MDRD: ABNORMAL ML/MIN/{1.73_M2}
GFR SERPL CREATININE-BSD FRML MDRD: ABNORMAL ML/MIN/{1.73_M2}
GLUCOSE BLD-MCNC: 106 MG/DL (ref 70–99)
HCT VFR BLD CALC: 34.1 % (ref 36–46)
HEMOGLOBIN: 11.1 G/DL (ref 12–16)
Lab: NORMAL
MCH RBC QN AUTO: 30.2 PG (ref 26–34)
MCHC RBC AUTO-ENTMCNC: 32.6 G/DL (ref 31–37)
MCV RBC AUTO: 92.6 FL (ref 80–100)
PDW BLD-RTO: 13.8 % (ref 11–14.5)
PLATELET # BLD: 341 K/UL (ref 140–450)
PMV BLD AUTO: 8 FL (ref 6–12)
POTASSIUM SERPL-SCNC: 4.1 MMOL/L (ref 3.7–5.3)
RBC # BLD: 3.68 M/UL (ref 4–5.2)
SODIUM BLD-SCNC: 139 MMOL/L (ref 135–144)
SPECIMEN DESCRIPTION: NORMAL
STATUS: NORMAL
WBC # BLD: 16.9 K/UL (ref 3.5–11)

## 2018-01-11 PROCEDURE — 87804 INFLUENZA ASSAY W/OPTIC: CPT

## 2018-01-11 PROCEDURE — 85379 FIBRIN DEGRADATION QUANT: CPT

## 2018-01-11 PROCEDURE — 85027 COMPLETE CBC AUTOMATED: CPT

## 2018-01-11 PROCEDURE — 36415 COLL VENOUS BLD VENIPUNCTURE: CPT

## 2018-01-11 PROCEDURE — 71046 X-RAY EXAM CHEST 2 VIEWS: CPT

## 2018-01-11 PROCEDURE — 80048 BASIC METABOLIC PNL TOTAL CA: CPT

## 2018-01-11 NOTE — PROGRESS NOTES
Patient into office due to fevers and dyspnea on exertion. Influenza was negative. Patient afebrile in the office. 98.2°F, pulse ox 100% on room air, heart rate 72, respirations 18. Patient concern for pneumonia versus PE. Patient with history of DVT. Recent left total knee arthroplasty. Restarted on anticoagulation postop. Patient sent for blood work and a chest x-ray.      Pt chest x ray with no acute findings- pt d dimer >3000- Pt sent to ER for prompt workup to RO PE

## 2018-06-18 ENCOUNTER — OFFICE VISIT (OUTPATIENT)
Dept: OPHTHALMOLOGY | Age: 63
End: 2018-06-18
Payer: COMMERCIAL

## 2018-06-18 DIAGNOSIS — H40.053 OCULAR HYPERTENSION, BILATERAL: Primary | ICD-10-CM

## 2018-06-18 DIAGNOSIS — H25.813 COMBINED FORMS OF AGE-RELATED CATARACT OF BOTH EYES: ICD-10-CM

## 2018-06-18 PROCEDURE — 99214 OFFICE O/P EST MOD 30 MIN: CPT | Performed by: OPHTHALMOLOGY

## 2018-06-18 RX ORDER — BENOXINATE HCL/FLUORESCEIN SOD 0.4%-0.25%
1 DROPS OPHTHALMIC (EYE) ONCE
Status: COMPLETED | OUTPATIENT
Start: 2018-06-18 | End: 2018-06-18

## 2018-06-18 RX ORDER — PHENYLEPHRINE HCL 2.5 %
1 DROPS OPHTHALMIC (EYE) ONCE
Status: COMPLETED | OUTPATIENT
Start: 2018-06-18 | End: 2018-06-18

## 2018-06-18 RX ORDER — LATANOPROST 50 UG/ML
1 SOLUTION/ DROPS OPHTHALMIC NIGHTLY
Qty: 3 BOTTLE | Refills: 3 | Status: SHIPPED | OUTPATIENT
Start: 2018-06-18

## 2018-06-18 RX ORDER — TROPICAMIDE 10 MG/ML
1 SOLUTION/ DROPS OPHTHALMIC ONCE
Status: COMPLETED | OUTPATIENT
Start: 2018-06-18 | End: 2018-06-18

## 2018-06-18 RX ADMIN — Medication 1 DROP: at 16:37

## 2018-06-18 RX ADMIN — TROPICAMIDE 1 DROP: 10 SOLUTION/ DROPS OPHTHALMIC at 16:38

## 2018-06-18 ASSESSMENT — SLIT LAMP EXAM - LIDS
COMMENTS: MILD BLEPHARITIS. MILD MGD
COMMENTS: MILD BLEPHARITIS. MILD MGD

## 2018-06-18 ASSESSMENT — VISUAL ACUITY
CORRECTION_TYPE: GLASSES
METHOD: SNELLEN - LINEAR
OS_CC: 20/20

## 2018-06-18 ASSESSMENT — TONOMETRY
OD_IOP_MMHG: 15
IOP_METHOD: TONOPEN
OS_IOP_MMHG: 17

## 2018-07-12 ENCOUNTER — OFFICE VISIT (OUTPATIENT)
Dept: OBGYN | Age: 63
End: 2018-07-12
Payer: COMMERCIAL

## 2018-07-12 ENCOUNTER — HOSPITAL ENCOUNTER (OUTPATIENT)
Age: 63
Setting detail: SPECIMEN
Discharge: HOME | End: 2018-07-12
Payer: COMMERCIAL

## 2018-07-12 ENCOUNTER — HOSPITAL ENCOUNTER (OUTPATIENT)
Dept: MAMMOGRAPHY | Age: 63
Discharge: HOME | End: 2018-07-14
Payer: COMMERCIAL

## 2018-07-12 VITALS
DIASTOLIC BLOOD PRESSURE: 80 MMHG | BODY MASS INDEX: 33.77 KG/M2 | HEIGHT: 69 IN | SYSTOLIC BLOOD PRESSURE: 124 MMHG | RESPIRATION RATE: 16 BRPM | HEART RATE: 80 BPM | WEIGHT: 228 LBS

## 2018-07-12 DIAGNOSIS — Z12.31 SCREENING MAMMOGRAM, ENCOUNTER FOR: ICD-10-CM

## 2018-07-12 DIAGNOSIS — N95.2 VAGINAL ATROPHY: ICD-10-CM

## 2018-07-12 DIAGNOSIS — Z12.72 SCREENING FOR VAGINAL CANCER: ICD-10-CM

## 2018-07-12 DIAGNOSIS — N81.10 FEMALE CYSTOCELE: Primary | ICD-10-CM

## 2018-07-12 PROCEDURE — 99214 OFFICE O/P EST MOD 30 MIN: CPT | Performed by: NURSE PRACTITIONER

## 2018-07-12 PROCEDURE — 77063 BREAST TOMOSYNTHESIS BI: CPT

## 2018-07-12 PROCEDURE — G0145 SCR C/V CYTO,THINLAYER,RESCR: HCPCS

## 2018-07-12 NOTE — PROGRESS NOTES
Benja Genao  2018              57 y.o. Chief Complaint   Patient presents with    Gynecologic Exam         No LMP recorded. Patient has had a hysterectomy. No referring provider defined for this encounter. HPI :Annual Exam  Patient presents for annual exam.  Counseling on healthy lifestyle reviewed, as well as the need for self breast exam. We reviewed the need for Kegal exercises. We discussed and reviewed the need for routine screenings and immunization updates when appropriate. Discussed balance. Also uses stationary bike. Performs monthly self breast exams. Good bladder control. Occasional SKY, but overall has fairly good bladder control. Hospitalized for PE post TKR earlier this year. The patient is sexually active. last pap: was normal, last mammogram: was normal  The patient has regular exercise: yes . We did review the need for and frequency of both weight bearing and strengthening as tolerated by the patient. ________________________________________________________________________  Obstetric History       T0      L2     SAB0   TAB0   Ectopic0   Molar0   Multiple0   Live Births2       # Outcome Date GA Lbr Agusto/2nd Weight Sex Delivery Anes PTL Lv   2 Para            1 Para                 Past Medical History:   Diagnosis Date    Allergic rhinitis     Cervical cancer ([facility] [address]     History of squamous cell carcinoma of the cervix    Chronic LBP     evaluated in past by Mikey Bush/Sis/Katiana/Alvaro, chiropractor.  1.) Evaluation with injections by Dr Inder Littlejohn 2.) MRI,   3) MRI,  significant foraminal stenosis at L5-S1 on the right and stenosis of foramen, left greater than the right, at L4-L5. 4.) Dr Néstor Santoyo evaluation, . 5.) Dr Froy nieves with injection x1, benefitted her in     CTS (carpal tunnel syndrome)     LEFT    Degeneration of lumbar intervertebral disc 01/10/2016    [facility] L2/3, L4/L5, L5/S1, rosalind injections, MRI    KNEE [facility]ROSCOPY [facility]    OTHER SURGICAL HISTORY  5/23/13     Interlaminar epidural steroid injection L5-S1.    OTHER SURGICAL HISTORY  1/15/16    right L5 TFE    SPINE SURGERY  5/23/13    L5/S1 IESI    TOTAL KNEE ARTHROPLASTY Left 12/19/2017    Dr. Fozia Appiah  1/31/2003    Tiny sliding hiatal hernia. Minimal antral erythema. No evidence of Franco's.     VARICOSE VEIN SURGERY Left 8-2013    laser ablation     Family History   Problem Relation Age of Onset    Diabetes Mother     Cancer Mother         cervical    Cataracts Mother     Glaucoma Mother     Other Father         ruptured bowel and trouble with drinking    Cataracts Daughter         Factor V Leiden, homozygous    Cataracts Daughter         Factor V Leiden,  heterozygous    Heart Disease Neg Hx      Social History     Social History    Marital status:      Spouse name: N/A    Number of children: 2    Years of education: N/A     Occupational History     [employer]     Social History Main Topics    Smoking status: Never Smoker    Smokeless tobacco: Never Used    Alcohol use No    Drug use: No    Sexual activity: Yes     Partners: Male     Other Topics Concern    Not on file     Social History Narrative    No narrative on file       MEDICATIONS:  Current Outpatient Prescriptions   Medication Sig Dispense Refill    latanoprost (XALATAN) 0.005 % ophthalmic solution Place 1 drop into both eyes nightly 3 Bottle 3    Elastic Bandages & Supports (MEDICAL COMPRESSION STOCKINGS) MISC 1 each by Does not apply route daily as needed (venous insuffiency) 20-30 mmHg 2 each 0    simvastatin (ZOCOR) 20 MG tablet TAKE 1 TABLET NIGHTLY 90 tablet 3    atenolol (TENORMIN) 100 MG tablet TAKE 1 TABLET DAILY 90 tablet 3    tiZANidine (ZANAFLEX) 2 MG capsule Take 1 capsule by mouth 3 times daily 90 capsule 1    losartan-hydrochlorothiazide (HYZAAR) 100-25 MG per tablet Take 1 tablet by mouth 1. ) Admit to [facility], 05/05, with hypertensive urgency with some right facial tingling, negative CT, negative MRI, 2.) 24 hr urine cortisol normal, 02/07, 24 hour urine metanephrine normal, 02/07 3.) MRI of renal arteries okay, 05/08    Hypomagnesemia     IBS (irritable bowel syndrome)     history of    IFG (impaired fasting glucose)     Migraine headache     evaluated by Dr Corinna Rasmussen and Dr Aron Lesch in past. Failed Zomig, Imitrex, Maxalt, multiple NSAIDs, and Fioricet. Failed amitriptyline secondary to intolerance.  Mitral regurgitation     Mildly dilated left atrium and mild to moderate MR on echo, 01/11. 1.) Repeat echocardiogram, 06/12 with EF of 50-55%, left atrial enlargement, Mild MR   2) Echo 9/16  trivial MR, grade 1 allen dysfxn, RSVP 41    Obesity     Ptosis of right eyelid     MILD,CHRONIC    [facility]/10/2016    [facility]    Varicose veins     with sclerotherapy by Dr Kenyatta Uribe         Patient Active Problem List   Diagnosis    Venous insufficiency (chronic) (peripheral)    Varicose vein    Chronic low back pain    DVT (deep venous thrombosis) (HCC)    IFG (impaired fasting glucose)    Mitral regurgitation    GERD (gastroesophageal reflux disease)    Spider vein, symptomatic    Hyperhomocysteinemia ([address]    Essential hypertension    Sprain of lumbar region    Degeneration of lumbar intervertebral disc    Dyslipidemia    Obesity (BMI 30-39. 9)    Primary osteoarthritis of one knee    Ocular hypertension, bilateral    Combined forms of age-related cataract of both eyes          Hereditary Breast, Ovarian, Colon and Uterine Cancer screening Done. Tobacco & Secondary smoke risks reviewed; instructed on cessation and avoidance    PLAN:  Return in about 1 year (around 7/12/2019) for Annual Exam.  Repeat pap per ASCCP 2013 guidelines  Return for annual exams  Mammograms every 1 year. If 35 yo and last mammogram was negative.   Routine health maintenance per patients

## 2018-07-15 DIAGNOSIS — R92.8 FOLLOW-UP EXAMINATION OF ABNORMAL MAMMOGRAM: Primary | ICD-10-CM

## 2018-07-18 ENCOUNTER — HOSPITAL ENCOUNTER (OUTPATIENT)
Dept: ULTRASOUND IMAGING | Age: 63
Discharge: HOME | End: 2018-07-20
Payer: COMMERCIAL

## 2018-07-18 DIAGNOSIS — R92.8 FOLLOW-UP EXAMINATION OF ABNORMAL MAMMOGRAM: ICD-10-CM

## 2018-07-18 PROCEDURE — 76642 ULTRASOUND BREAST LIMITED: CPT

## 2018-07-26 LAB — CYTOLOGY REPORT: NORMAL

## 2018-09-26 ENCOUNTER — OFFICE VISIT (OUTPATIENT)
Dept: OPHTHALMOLOGY | Age: 63
End: 2018-09-26
Payer: MEDICARE

## 2018-09-26 DIAGNOSIS — H40.053 OCULAR HYPERTENSION, BILATERAL: Primary | ICD-10-CM

## 2018-09-26 PROCEDURE — 92083 EXTENDED VISUAL FIELD XM: CPT | Performed by: OPHTHALMOLOGY

## 2018-10-04 ENCOUNTER — OFFICE VISIT (OUTPATIENT)
Dept: OPHTHALMOLOGY | Age: 63
End: 2018-10-04
Payer: MEDICARE

## 2018-10-04 DIAGNOSIS — H40.053 OCULAR HYPERTENSION, BILATERAL: Primary | ICD-10-CM

## 2018-10-04 DIAGNOSIS — H02.831 DERMATOCHALASIS OF BOTH UPPER EYELIDS: ICD-10-CM

## 2018-10-04 DIAGNOSIS — H02.834 DERMATOCHALASIS OF BOTH UPPER EYELIDS: ICD-10-CM

## 2018-10-04 DIAGNOSIS — H25.813 COMBINED FORMS OF AGE-RELATED CATARACT OF BOTH EYES: ICD-10-CM

## 2018-10-04 PROCEDURE — 92133 CPTRZD OPH DX IMG PST SGM ON: CPT | Performed by: OPHTHALMOLOGY

## 2018-10-04 PROCEDURE — G8399 PT W/DXA RESULTS DOCUMENT: HCPCS | Performed by: OPHTHALMOLOGY

## 2018-10-04 PROCEDURE — 99214 OFFICE O/P EST MOD 30 MIN: CPT | Performed by: OPHTHALMOLOGY

## 2018-10-04 PROCEDURE — 1101F PT FALLS ASSESS-DOCD LE1/YR: CPT | Performed by: OPHTHALMOLOGY

## 2018-10-04 PROCEDURE — 3017F COLORECTAL CA SCREEN DOC REV: CPT | Performed by: OPHTHALMOLOGY

## 2018-10-04 PROCEDURE — 1036F TOBACCO NON-USER: CPT | Performed by: OPHTHALMOLOGY

## 2018-10-04 PROCEDURE — 76514 ECHO EXAM OF EYE THICKNESS: CPT | Performed by: OPHTHALMOLOGY

## 2018-10-04 PROCEDURE — 1090F PRES/ABSN URINE INCON ASSESS: CPT | Performed by: OPHTHALMOLOGY

## 2018-10-04 PROCEDURE — G8484 FLU IMMUNIZE NO ADMIN: HCPCS | Performed by: OPHTHALMOLOGY

## 2018-10-04 PROCEDURE — 1123F ACP DISCUSS/DSCN MKR DOCD: CPT | Performed by: OPHTHALMOLOGY

## 2018-10-04 PROCEDURE — 92145 CORNEAL HYSTERESIS DETER: CPT | Performed by: OPHTHALMOLOGY

## 2018-10-04 PROCEDURE — 4040F PNEUMOC VAC/ADMIN/RCVD: CPT | Performed by: OPHTHALMOLOGY

## 2018-10-04 PROCEDURE — G8417 CALC BMI ABV UP PARAM F/U: HCPCS | Performed by: OPHTHALMOLOGY

## 2018-10-04 PROCEDURE — G8427 DOCREV CUR MEDS BY ELIG CLIN: HCPCS | Performed by: OPHTHALMOLOGY

## 2018-10-04 RX ORDER — TROPICAMIDE 10 MG/ML
1 SOLUTION/ DROPS OPHTHALMIC ONCE
Status: COMPLETED | OUTPATIENT
Start: 2018-10-04 | End: 2018-10-04

## 2018-10-04 RX ORDER — PHENYLEPHRINE HCL 2.5 %
1 DROPS OPHTHALMIC (EYE) ONCE
Status: COMPLETED | OUTPATIENT
Start: 2018-10-04 | End: 2018-10-04

## 2018-10-04 RX ADMIN — Medication 1 DROP: at 15:16

## 2018-10-04 RX ADMIN — TROPICAMIDE 1 DROP: 10 SOLUTION/ DROPS OPHTHALMIC at 15:16

## 2018-10-04 ASSESSMENT — TONOMETRY
OS_IOP_MMHG: 15
IOP_METHOD: NON-CONTACT AIR PUFF
OD_IOP_MMHG: 16

## 2018-10-04 ASSESSMENT — VISUAL ACUITY
OS_CC+: 1
CORRECTION_TYPE: GLASSES
OS_CC: 20/20
METHOD: SNELLEN - LINEAR

## 2018-10-04 ASSESSMENT — SLIT LAMP EXAM - LIDS
COMMENTS: MILD BLEPHARITIS. MILD MGD, 2+ DERMATOCHALASIS - UPPER LID
COMMENTS: MILD BLEPHARITIS. MILD MGD, 2+ DERMATOCHALASIS - UPPER LID

## 2018-10-04 ASSESSMENT — PACHYMETRY
OS_CT(UM): 525
OD_CT(UM): 532

## 2018-10-04 NOTE — PROGRESS NOTES
Wanda Enriquez is a 70 y.o. female here for a complete eye exam.      Chief Complaint   Patient presents with    Follow-up    Ophthalmic Testing       HPI     Follow up for Visual field done on 09/26/18  and OCT glaucoma today with dilation. Occasional blurring of vision OU for more than one week that is better with eyeglasses. ROS      Main Ophthalmology Exam     Slit Lamp Exam       Right Left    Lids/Lashes Mild Blepharitis. Mild MGD, 2+ Dermatochalasis - upper lid Mild Blepharitis. Mild MGD, 2+ Dermatochalasis - upper lid    Conjunctiva/Sclera Clear and white Clear and white    Cornea Clear Clear    Anterior Chamber Deep, no cells, no flare Deep, no cells, no flare    Iris Round and reactive Round and reactive    Lens 2+ Nuclear sclerosis, 1+ Cortical cataract 2+ Nuclear sclerosis, 1+ Cortical cataract          Fundus Exam       Right Left    Vitreous Normal Normal    Disc No edema and no pallor No edema and no pallor    C/D Ratio Vertical 0.55 0.6    C/D Ratio Horizontal 0.55 0.65    Macula Normal architecture Normal architecture    Vessels Normal course and caliber Normal course and caliber    Periphery No breaks, tears, or tetachments No breaks, tears, or detachments                   Tonometry     Tonometry (Non-contact air puff, 1:41 PM)       Right Left    Pressure 16 15              Visual Acuity     Visual Acuity (Snellen - Linear)       Right Left    Dist cc 20/20 20/20 1    Correction:  Glasses               Not recorded          Not recorded          Not recorded         Not recorded          Past Medical History:   Diagnosis Date    Allergic rhinitis     Cervical cancer ([facility] [address]     History of squamous cell carcinoma of the cervix    Chronic LBP     evaluated in past by Drs Rachelle/Sis/Katiana/Alvaro, chiropractor.  1.) Evaluation with injections by Dr Rebeka Weiss 2.) MRI, 11/08  3) MRI, 01/12 significant foraminal stenosis at L5-S1 on the right and stenosis of foramen, left greater

## 2019-08-07 ENCOUNTER — TELEPHONE (OUTPATIENT)
Dept: MAMMOGRAPHY | Age: 64
End: 2019-08-07

## 2019-08-07 DIAGNOSIS — Z12.31 SCREENING MAMMOGRAM, ENCOUNTER FOR: Primary | ICD-10-CM

## 2019-08-13 ENCOUNTER — HOSPITAL ENCOUNTER (OUTPATIENT)
Dept: MAMMOGRAPHY | Age: 64
Discharge: HOME | End: 2019-08-15
Payer: MEDICARE

## 2019-08-13 DIAGNOSIS — Z12.31 SCREENING MAMMOGRAM, ENCOUNTER FOR: ICD-10-CM

## 2019-08-13 PROCEDURE — 77063 BREAST TOMOSYNTHESIS BI: CPT

## 2019-08-29 ENCOUNTER — HOSPITAL ENCOUNTER (OUTPATIENT)
Age: 64
Setting detail: SPECIMEN
Discharge: HOME | End: 2019-08-29
Payer: MEDICARE

## 2019-08-29 ENCOUNTER — OFFICE VISIT (OUTPATIENT)
Dept: OBGYN | Age: 64
End: 2019-08-29
Payer: MEDICARE

## 2019-08-29 VITALS
HEART RATE: 94 BPM | BODY MASS INDEX: 34.21 KG/M2 | DIASTOLIC BLOOD PRESSURE: 80 MMHG | SYSTOLIC BLOOD PRESSURE: 130 MMHG | WEIGHT: 231 LBS | HEIGHT: 69 IN

## 2019-08-29 DIAGNOSIS — Z12.72 SCREENING FOR VAGINAL CANCER: ICD-10-CM

## 2019-08-29 DIAGNOSIS — Z12.31 OTHER SCREENING MAMMOGRAM: ICD-10-CM

## 2019-08-29 DIAGNOSIS — Z01.419 WELL FEMALE EXAM WITH ROUTINE GYNECOLOGICAL EXAM: ICD-10-CM

## 2019-08-29 DIAGNOSIS — N81.6 RECTOCELE: Primary | ICD-10-CM

## 2019-08-29 PROCEDURE — G0101 CA SCREEN;PELVIC/BREAST EXAM: HCPCS | Performed by: NURSE PRACTITIONER

## 2019-08-29 PROCEDURE — 99214 OFFICE O/P EST MOD 30 MIN: CPT | Performed by: NURSE PRACTITIONER

## 2019-08-29 PROCEDURE — G0145 SCR C/V CYTO,THINLAYER,RESCR: HCPCS

## 2019-08-29 RX ORDER — LIDOCAINE AND PRILOCAINE 25; 25 MG/G; MG/G
CREAM TOPICAL
COMMUNITY
Start: 2018-10-31

## 2019-08-29 RX ORDER — WARFARIN SODIUM 3 MG/1
3 TABLET ORAL
COMMUNITY

## 2019-08-29 RX ORDER — PROCHLORPERAZINE MALEATE 10 MG
TABLET ORAL
COMMUNITY
Start: 2018-10-21

## 2019-08-29 RX ORDER — ESOMEPRAZOLE MAGNESIUM 40 MG/1
40 CAPSULE, DELAYED RELEASE ORAL PRN
COMMUNITY

## 2019-08-29 RX ORDER — WARFARIN SODIUM 1 MG/1
TABLET ORAL
COMMUNITY
Start: 2018-10-28

## 2019-08-29 RX ORDER — WARFARIN SODIUM 2 MG/1
TABLET ORAL
COMMUNITY
Start: 2018-10-28

## 2019-08-29 RX ORDER — ALLOPURINOL 100 MG/1
100 TABLET ORAL
COMMUNITY
Start: 2019-04-03

## 2019-08-29 NOTE — PROGRESS NOTES
01/10/2016    Health system L2/3, L4/L5, L5/S1, rosalind injections, MRI 12/16  Dr Coco Reyna    Diverticulosis     on colonoscopy 08/11    DVT (deep venous thrombosis) ([facility] [address] 09/03    1.)Factor V Leiden mutation, heterozygous 2.) Hyperhomocysteinemia 3.) Dr Lauren nieves recommending lifelong anticoagulation. 4.) Post-phlebitic syndrome on right. Dr Nahun nieves 2012 rec continued permanent anticoag.  Fatty infiltration of liver     Noted on ultrasound September 2014. Discussed weight loss.  GERD (gastroesophageal reflux disease)     EGD, 01/03, repeat egd egd 7/14 Dr Dennis Plummer. Esophagitis    Glaucoma     Dr Marissa North Hemorrhoids     on colonoscopy 08/11    Hyperhomocysteinemia ([facility] [address]     Hyperlipidemia     Hypertension     1.) Admit to [facility], 05/05, with hypertensive urgency with some right facial tingling, negative CT, negative MRI, 2.) 24 hr urine cortisol normal, 02/07, 24 hour urine metanephrine normal, 02/07 3.) MRI of renal arteries okay, 05/08    Hypomagnesemia     IBS (irritable bowel syndrome)     history of    IFG (impaired fasting glucose)     Migraine headache     evaluated by Dr Teto Hicks and Dr Suzanna Silva in past. Failed Zomig, Imitrex, Maxalt, multiple NSAIDs, and Fioricet. Failed amitriptyline secondary to intolerance.  Mitral regurgitation     Mildly dilated left atrium and mild to moderate MR on echo, 01/11. 1.) Repeat echocardiogram, 06/12 with EF of 50-55%, left atrial enlargement, Mild MR   2) Echo 9/16  trivial MR, grade 1 allen dysfxn, RSVP 41    Non Hodgkin's lymphoma ([facility] [address] 10/01/2018    Obesity     Ptosis of right eyelid     MILD,CHRONIC    Sprain of lumbar region 1/10/2016    Health system    Varicose veins     with sclerotherapy by Dr Oly Ashraf                                                                   Past Surgical History:   Procedure Laterality Date    APPENDECTOMY      COLONOSCOPY  8/19/2011    Mild sigmoid diverticulosis. Internal hemorrhoids. Multiple sentinel piles. information about prior breast procedures including mammograms, biopsies, or surgeries. If you've had mammograms done elsewhere, please request any previous mammogram films from those organizations prior to your appointment. Order Specific Question:   Reason for exam:     Answer:   SCREENING MAMMOGRAM    PAP SMEAR     Standing Status:   Future     Standing Expiration Date:   10/29/2019     Order Specific Question:   Collection Type     Answer: Thin Prep     Order Specific Question:   Prior Abnormal Pap Test     Answer:   No     Order Specific Question:   Screening or Diagnostic     Answer:   Screening     Order Specific Question:   HPV Requested?      Answer:   Yes - If Abnormal Reflex HPV     Order Specific Question:   High Risk Patient     Answer:   N/A    CT CA SCREEN;PELVIC/BREAST EXAM    CT OBTAINING SCREEN PAP SMEAR       Kevin Terrell  8/29/2019

## 2019-09-09 LAB — CYTOLOGY REPORT: NORMAL

## 2020-09-03 ENCOUNTER — HOSPITAL ENCOUNTER (OUTPATIENT)
Age: 65
Setting detail: SPECIMEN
Discharge: HOME | End: 2020-09-03
Payer: MEDICARE

## 2020-09-03 ENCOUNTER — OFFICE VISIT (OUTPATIENT)
Dept: OBGYN | Age: 65
End: 2020-09-03
Payer: MEDICARE

## 2020-09-03 ENCOUNTER — HOSPITAL ENCOUNTER (OUTPATIENT)
Dept: MAMMOGRAPHY | Age: 65
Discharge: HOME | End: 2020-09-05
Payer: MEDICARE

## 2020-09-03 VITALS
TEMPERATURE: 98.6 F | WEIGHT: 244 LBS | SYSTOLIC BLOOD PRESSURE: 136 MMHG | OXYGEN SATURATION: 97 % | HEART RATE: 80 BPM | HEIGHT: 68 IN | DIASTOLIC BLOOD PRESSURE: 72 MMHG | BODY MASS INDEX: 36.98 KG/M2

## 2020-09-03 PROCEDURE — 77063 BREAST TOMOSYNTHESIS BI: CPT

## 2020-09-03 PROCEDURE — 99397 PER PM REEVAL EST PAT 65+ YR: CPT

## 2020-09-03 PROCEDURE — 99214 OFFICE O/P EST MOD 30 MIN: CPT | Performed by: NURSE PRACTITIONER

## 2020-09-03 PROCEDURE — 88164 CYTOPATH TBS C/V MANUAL: CPT

## 2020-09-03 PROCEDURE — P3000 SCREEN PAP BY TECH W MD SUPV: HCPCS

## 2020-09-03 PROCEDURE — 99214 OFFICE O/P EST MOD 30 MIN: CPT

## 2020-09-03 NOTE — PROGRESS NOTES
evaluated in past by Mikey Bush/Sis/Katiana/Alvaro, chiropractor. 1.) Evaluation with injections by Dr Bishop Villeda 2.) MRI, 11/08  3) MRI, 01/12 significant foraminal stenosis at L5-S1 on the right and stenosis of foramen, left greater than the right, at L4-L5. 4.) Dr Joseph Sites evaluation, 2012. 5.) Dr Taylor nieves with injection x1, benefitted her in 2012    CTS (carpal tunnel syndrome)     LEFT    Degeneration of lumbar intervertebral disc 01/10/2016    [facility] L2/3, L4/L5, L5/S1, rosalind injections, MRI 12/16  Dr Evita Brownlee    Diverticulosis     on colonoscopy 08/11    DVT (deep venous thrombosis) ([address] 09/03    1.)Factor V Leiden mutation, heterozygous 2.) Hyperhomocysteinemia 3.) Dr Yara nieves recommending lifelong anticoagulation. 4.) Post-phlebitic syndrome on right. Dr Mohan nieves 2012 rec continued permanent anticoag.  Fatty infiltration of liver     Noted on ultrasound September 2014. Discussed weight loss.  GERD (gastroesophageal reflux disease)     EGD, 01/03, repeat egd egd 7/14 Dr Jammie Meckel. Esophagitis    Glaucoma     Dr Lucy You Hemorrhoids     on colonoscopy 08/11    [facility]hom[facility]stein[facility] [address]     Hyperlipidemia     Hypertension     1.) Admit to [facility], 05/05, with hypertensive urgency with some right facial tingling, negative CT, negative MRI, 2.) 24 hr urine cortisol normal, 02/07, 24 hour urine metanephrine normal, 02/07 3.) MRI of renal arteries okay, 05/08    Hypomagnesemia     IBS (irritable bowel syndrome)     history of    IFG (impaired fasting glucose)     Migraine headache     evaluated by Dr Saul Bradley and Dr Mauricio Sites in past. Failed Zomig, Imitrex, Maxalt, multiple NSAIDs, and Fioricet. Failed amitriptyline secondary to intolerance.     Mitral regurgitation     Mildly dilated left atrium and mild to moderate MR on echo, 01/11. 1.) Repeat echocardiogram, 06/12 with EF of 50-55%, left atrial enlargement, Mild MR   2) Echo 9/16  trivial MR, grade 1 allen dysfxn, RSVP 41    Non Hodgkin's lymphoma ([facility] [address] 10/01/2018    Obesity     Ptosis of right eyelid     [facility]    Sprain of lumbar region 1/10/2016    [facility]    Varicose veins     with sclerotherapy by Dr Francois Murphy                                                                   Past Surgical History:   Procedure Laterality Date    APPENDECTOMY      COLONOSCOPY  8/19/2011    Mild sigmoid diverticulosis. Internal hemorrhoids. Multiple sentinel piles.  HEEL SPUR SURGERY Right 1990    HYSTERECTOMY, TOTAL ABDOMINAL  1986    had squamous cell carcinoma in situ    [facility]    OTHER SURGICAL HISTORY  5/23/13     Interlaminar epidural steroid injection L5-S1.    OTHER SURGICAL HISTORY  1/15/16    right L5 TFE    SPINE SURGERY  5/23/13    L5/S1 IESI    TOTAL KNEE ARTHROPLASTY Left 12/19/2017    Dr. Kyle Anguiano  1/31/2003    Tiny sliding hiatal hernia. Minimal antral erythema. No evidence of Franco's.     VARICOSE VEIN SURGERY Left 8-2013    laser ablation     Family History   Problem Relation Age of Onset    Diabetes Mother     Cancer Mother         cervical    Cataracts Mother     Glaucoma Mother     Other Father         ruptured bowel and trouble with drinking    Cataracts Daughter         Factor V Leiden, homozygous    Cataracts Daughter         Factor V Leiden,  heterozygous    Heart Disease Neg Hx      Social History     Socioeconomic History    Marital status:      Spouse name: Not on file    Number of children: 2    Years of education: Not on file    Highest education level: Not on file   Occupational History     Employer: [address] [place] [address] Financial resource strain: Not on file    Food insecurity     Worry: Not on file     Inability: Not on file    Transportation needs     Medical: Not on file     Non-medical: Not on file   Tobacco Use    Smoking status: Never Smoker    Smokeless tobacco: Never Used   Substance and Sexual Activity    Alcohol use: No    Drug use: No    Sexual activity: Yes     Partners: Male   Lifestyle    Physical activity     Days per week: Not on file     Minutes per session: Not on file    Stress: Not on file   Relationships    Social connections     Talks on phone: Not on file     Gets together: Not on file     Attends Restorationism service: Not on file     Active member of club or organization: Not on file     Attends meetings of clubs or organizations: Not on file     Relationship status: Not on file    Intimate partner violence     Fear of current or ex partner: Not on file     Emotionally abused: Not on file     Physically abused: Not on file     Forced sexual activity: Not on file   Other Topics Concern    Not on file   Social History Narrative    Not on file       MEDICATIONS:  Current Outpatient Medications   Medication Sig Dispense Refill    lidocaine-prilocaine (EMLA) 2.5-2.5 % cream Apply to affected area once      warfarin (COUMADIN) 1 MG tablet TAKE 1 TABLET DAILY AS INSTRUCTED      warfarin (COUMADIN) 2 MG tablet TAKE 2 TABLETS DAILY AS INSTRUCTED      warfarin (COUMADIN) 3 MG tablet Take 3 mg by mouth      esomeprazole (NEXIUM) 40 MG delayed release capsule Take 40 mg by mouth as needed      bimatoprost (LUMIGAN) 0.01 % SOLN ophthalmic drops Lumigan 0.01 % eye drops      latanoprost (XALATAN) 0.005 % ophthalmic solution Place 1 drop into both eyes nightly 3 Bottle 3    Elastic Bandages & Supports (MEDICAL COMPRESSION STOCKINGS) MISC 1 each by Does not apply route daily as needed (venous insuffiency) 20-30 mmHg 2 each 0    simvastatin (ZOCOR) 20 MG tablet TAKE 1 TABLET NIGHTLY 90 tablet 3    atenolol (TENORMIN) 100 MG tablet TAKE 1 TABLET DAILY 90 tablet 3    losartan-hydrochlorothiazide (HYZAAR) 100-25 MG per tablet Take 1 tablet by mouth daily 90 tablet 3    folic acid (FOLVITE) 1 MG tablet Take 2 mg by mouth daily.       allopurinol (ZYLOPRIM) 100 MG tablet Take 100 mg by mouth      prochlorperazine (COMPAZINE) 10 MG tablet Take 1 tablet by mouth every 6 hours as needed for mild nausea or vomiting.  tiZANidine (ZANAFLEX) 2 MG capsule Take 1 capsule by mouth 3 times daily (Patient not taking: Reported on 9/3/2020) 90 capsule 1    rivaroxaban (XARELTO) 20 MG TABS tablet Take 1 tablet by mouth daily (with breakfast) (Patient not taking: Reported on 9/3/2020) 30 tablet 3    omeprazole (PRILOSEC) 20 MG capsule Take 20 mg by mouth as needed       magnesium oxide (MAG-OX) 400 MG tablet Take 400 mg by mouth daily. No current facility-administered medications for this visit. ALLERGIES:  Allergies as of 09/03/2020 - Review Complete 09/03/2020   Allergen Reaction Noted    Lisinopril  08/12/2013    Mobic [meloxicam]  08/12/2013    Pregabalin  08/12/2013    Sulfa antibiotics  08/12/2013    Ultram [tramadol]  08/12/2013    Vicodin [hydrocodone-acetaminophen]  08/12/2013           Gynecologic History:  Menarche: 12   Menopause at 50's      No LMP recorded. Patient has had a hysterectomy.   Hormone Exposure: No    Family History of Breast, Ovarian , Colon or Uterine Cancer: No     Preventative Health Testing:  Date of Last Pap Smear: 2019  Date of Last Mammogram: 2019  Date of Last Colonoscopy: 2011  Date of Last Bone Density: 2016  ________________________________________________________________________      Review Of Systems:  General ROS:  negative  Hematological and Lymphatic ROS:negative   Breast ROS: negative  Cardiovascular ROS: negative  Respiratory ROS: negative   Gastrointestinal ROS: negative  Genito-Urinary ROS: positive for cystocele, rectocele  Psychological ROS: negative  Neurological ROS: negative  Musculoskeletal ROS: negative  Dermatological ROS: negative                                                                                                                                                                                   PHYSICAL Exam: Constitutional:  Blood pressure 136/72, pulse 80, temperature 98.6 °F (37 °C), temperature source Temporal, height 5' 8\" (1.727 m), weight 244 lb (110.7 kg), SpO2 97 %, not currently breastfeeding. General Appearance: This  is a well Developed, well Nourished, well groomed female. Her BMI was reviewed. Skin:  There was a Normal Inspection of the skin without rashes or lesions. There were no rashes. (Papular, Maculopapular, Hives, Pustular, Macular)     There were no lesions (Ulcers, Erythema, Abn. Appearing Nevi)      Lymphatic:  No Lymph Nodes were Palpable in the neck , axilla or groin. Neck and EENT:  The neck was supple. There were no masses   The thyroid was not enlarged and had no masses. PERRLA, Nares Patent No Masses    Respiratory: The lungs were auscultated and found to be clear. There were no rales, rhonchi or wheezes. There was a good respiratory effort. Cardiovascular: The heart was in a regular rate and rhythm. . No S3 or S4. There was no murmur appreciated. Extremities: The patients extremities were without calf tenderness, edema, or varicosities. There was full range of motion in all four extremities. Pulses in all four extremities    Abdomen: The abdomen was soft and non-tender. There were good bowel sounds in all quadrants and there was no guarding, rebound or rigidity. On evaluation there was no evidence of hepatosplenomegaly and there was no costal vertebral blanca tenderness bilaterally. No hernias were appreciated. Psych:   The patient had a normal Orientation to: Time, Place, Person, and Situation  Mood and affect appropriate    Breast:  (Chest)  normal appearance, no masses or tenderness, Inspection negative, No nipple retraction or dimpling, No nipple discharge or bleeding, No axillary or supraclavicular adenopathy, Normal to palpation without dominant masses      Pelvic Exam:  External genitalia: normal general appearance  Urinary system: urethral meatus normal  Vaginal: atrophic mucosa, cystocele present, and unchanged,  and rectocele present, and unchanged  Cervix: absent and removed surgically  Adnexa: normal bimanual exam and non palpable  Uterus: absent and removed surgically  Conventional pap obtained from vaginal apex    Musculosk:  Normal Gait and station was noted. Digits were evaluated without abnormal findings. Range of motion, stability and strength were evaluated and found to be appropriate for the patients age. ASSESSMENT:      75 y.o. Annual   Diagnosis Orders   1. Menopause     2. Other screening mammogram  RONEL DAHIANA DIGITAL SCREEN BILATERAL   3. Rectocele     4. Screening for vaginal cancer  PAP SMEAR        Chief Complaint   Patient presents with    Gynecologic Exam     yearly, no concern         Past Medical History:   Diagnosis Date    Allergic rhinitis     Cervical cancer ([facility] [address]     History of squamous cell carcinoma of the cervix    Chronic LBP     evaluated in past by Mikey Bush/Sis/Katiana/Alvaro, chiropractor. 1.) Evaluation with injections by Dr Garcia Hernandez 2.) MRI, 11/08  3) MRI, 01/12 significant foraminal stenosis at L5-S1 on the right and stenosis of foramen, left greater than the right, at L4-L5. 4.) Dr Kia Renteria evaluation, 2012. 5.) Dr Dede Cockayne eval with injection x1, benefitted her in 2012    CTS (carpal tunnel syndrome)     LEFT    Degeneration of lumbar intervertebral disc 01/10/2016    [facility] L2/3, L4/L5, L5/S1, rosalind injections, MRI 12/16  Dr Susy Martinez    Diverticulosis     on colonoscopy 08/11    DVT (deep venous thrombosis) ([facility] [address] 09/03    1.)Factor V Leiden mutation, heterozygous 2.) Hyperhomocysteinemia 3.) Dr Michell nieves recommending lifelong anticoagulation. 4.) Post-phlebitic syndrome on right. Dr Rene nieves 2012 rec continued permanent anticoag.  Fatty infiltration of liver     Noted on ultrasound September 2014. Discussed weight loss.     GERD (gastroesophageal reflux disease)     EGD, 01/03, repeat egd egd 7/14 Dr Nakia Hutchinson. Esophagitis    Glaucoma     Dr Romulo Dodson Hemorrhoids     on colonoscopy 08/11    Hyperhomocysteinemia ([address]     Hyperlipidemia     Hypertension     1.) Admit to [facility], 05/05, with hypertensive urgency with some right facial tingling, negative CT, negative MRI, 2.) 24 hr urine cortisol normal, 02/07, 24 hour urine metanephrine normal, 02/07 3.) MRI of renal arteries okay, 05/08    Hypomagnesemia     IBS (irritable bowel syndrome)     history of    IFG (impaired fasting glucose)     Migraine headache     evaluated by Dr Ester Cardona and Dr Jackie Duong in past. Failed Zomig, Imitrex, Maxalt, multiple NSAIDs, and Fioricet. Failed amitriptyline secondary to intolerance.  Mitral regurgitation     Mildly dilated left atrium and mild to moderate MR on echo, 01/11. 1.) Repeat echocardiogram, 06/12 with EF of 50-55%, left atrial enlargement, Mild MR   2) Echo 9/16  trivial MR, grade 1 allen dysfxn, RSVP 41    Non Hodgkin's lymphoma ([facility] [address] 10/01/2018    Obesity     Ptosis of right eyelid     [address]    Sprain of lumbar region 1/10/2016    [facility]    Varicose veins     with sclerotherapy by Dr Mau López         Patient Active Problem List   Diagnosis    Venous insufficiency (chronic) (peripheral)    Varicose vein    Chronic low back pain    DVT (deep venous thrombosis) (H[facility])    IFG (impaired fasting glucose)    Mitral regurgitation    GERD (gastroesophageal reflux disease)    Spider vein, symptomatic    Hyperhomocysteinemia ([address]    Essential hypertension    Sprain of lumbar region    Degeneration of lumbar intervertebral disc    Dyslipidemia    Obesity (BMI 30-39. 9)    Primary osteoarthritis of one knee    Ocular hypertension, bilateral    Combined forms of age-related cataract of both eyes    Dermatochalasis of both upper eyelids          Hereditary Breast, Ovarian, Colon and Uterine Cancer screening Done.           Tobacco & Secondary smoke risks reviewed; instructed on cessation and avoidance    PLAN:  Return in about 1 year (around 9/3/2021) for Annual Exam, Mammogram.  Return for annual exams  Mammograms every 1 year. If 33 yo and last mammogram was negative. Routine health maintenance per patients PCP. Orders Placed This Encounter   Procedures    RONEL DAHIANA DIGITAL SCREEN BILATERAL     Standing Status:   Future     Standing Expiration Date:   11/3/2021     Order Specific Question:   Reason for exam:     Answer:   screening    PAP SMEAR     Hysterectomy 1986 for carcinoma in situ     Standing Status:   Future     Standing Expiration Date:   11/3/2020     Order Specific Question:   Collection Type     Answer:   Conventional     Order Specific Question:   Prior Abnormal Pap Test     Answer:   No     Order Specific Question:   Screening or Diagnostic     Answer:   Screening     Order Specific Question:   HPV Requested? Answer:   N/A     Order Specific Question:   High Risk Patient     Answer:   N/A     25 minutes spent face to face, 80% in counseling, education, and coordination of care.       Kalin Terrell  9/3/2020

## 2020-09-15 LAB — CYTOLOGY REPORT: NORMAL

## 2021-09-20 ENCOUNTER — TELEPHONE (OUTPATIENT)
Dept: OBGYN | Age: 66
End: 2021-09-20

## 2021-11-04 ENCOUNTER — TELEPHONE (OUTPATIENT)
Dept: OBGYN | Age: 66
End: 2021-11-04

## 2021-12-20 ENCOUNTER — TELEPHONE (OUTPATIENT)
Dept: OBGYN | Age: 66
End: 2021-12-20

## 2022-02-04 ENCOUNTER — TELEPHONE (OUTPATIENT)
Dept: OBGYN | Age: 67
End: 2022-02-04

## 2022-12-08 ENCOUNTER — OFFICE VISIT (OUTPATIENT)
Dept: OBGYN | Age: 67
End: 2022-12-08
Payer: MEDICARE

## 2022-12-08 ENCOUNTER — HOSPITAL ENCOUNTER (OUTPATIENT)
Age: 67
Setting detail: SPECIMEN
Discharge: HOME | End: 2022-12-08
Payer: MEDICARE

## 2022-12-08 VITALS
WEIGHT: 243.13 LBS | HEIGHT: 69 IN | BODY MASS INDEX: 36.01 KG/M2 | HEART RATE: 84 BPM | DIASTOLIC BLOOD PRESSURE: 78 MMHG | SYSTOLIC BLOOD PRESSURE: 110 MMHG

## 2022-12-08 DIAGNOSIS — Z78.0 MENOPAUSE: ICD-10-CM

## 2022-12-08 DIAGNOSIS — Z90.710 S/P COMPLETE HYSTERECTOMY: ICD-10-CM

## 2022-12-08 DIAGNOSIS — Z12.72 SCREENING FOR VAGINAL CANCER: ICD-10-CM

## 2022-12-08 DIAGNOSIS — Z01.419 WELL WOMAN EXAM WITH ROUTINE GYNECOLOGICAL EXAM: Primary | ICD-10-CM

## 2022-12-08 DIAGNOSIS — Z12.11 SCREEN FOR COLON CANCER: ICD-10-CM

## 2022-12-08 DIAGNOSIS — Z86.007 HISTORY OF SQUAMOUS CELL CARCINOMA IN SITU (SCCIS): ICD-10-CM

## 2022-12-08 DIAGNOSIS — Z12.31 ENCOUNTER FOR SCREENING MAMMOGRAM FOR MALIGNANT NEOPLASM OF BREAST: ICD-10-CM

## 2022-12-08 PROCEDURE — G0145 SCR C/V CYTO,THINLAYER,RESCR: HCPCS

## 2022-12-08 PROCEDURE — 87624 HPV HI-RISK TYP POOLED RSLT: CPT

## 2022-12-08 PROCEDURE — 99214 OFFICE O/P EST MOD 30 MIN: CPT | Performed by: OBSTETRICS & GYNECOLOGY

## 2022-12-08 RX ORDER — PREGABALIN 100 MG/1
CAPSULE ORAL 3 TIMES DAILY
COMMUNITY
Start: 2022-10-10

## 2022-12-08 RX ORDER — LANOLIN ALCOHOL/MO/W.PET/CERES
CREAM (GRAM) TOPICAL DAILY
COMMUNITY
Start: 2021-09-21

## 2022-12-08 ASSESSMENT — PATIENT HEALTH QUESTIONNAIRE - PHQ9
2. FEELING DOWN, DEPRESSED OR HOPELESS: 0
SUM OF ALL RESPONSES TO PHQ QUESTIONS 1-9: 0
SUM OF ALL RESPONSES TO PHQ9 QUESTIONS 1 & 2: 0
1. LITTLE INTEREST OR PLEASURE IN DOING THINGS: 0
SUM OF ALL RESPONSES TO PHQ QUESTIONS 1-9: 0

## 2022-12-08 NOTE — PROGRESS NOTES
History and Physical  [facility]    Chaneta Blocker Foor  2022              69 y.o. Chief Complaint   Patient presents with    Annual Exam       No LMP recorded. Patient has had a hysterectomy. Primary Care Physician: Rebeca Lopezr, MD    The patient was seen and examined. She has no chief complaint today and is here for her annual exam.  Her bowels are regular. There are no voiding complaints. She denies any bloating. She denies vaginal discharge and was counseled on STD's and the need for barrier contraception. HPI : Abdul Sandifer is a 69 y.o. female     Pt presents to office for annual exam. Pt without c/c. 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 Pt has had normal paps since. Pt has non-hodgkins lymphoma for which she is under the care of hematology  no Bloating  no Early Satiety  no Unexplained weight change of more than 15 lbs  no  PMB  no  PCB  ________________________________________________________________________  OB History    Para Term  AB Living   2 2 0 0 0 2   SAB IAB Ectopic Molar Multiple Live Births   0 0 0 0 0 2      # Outcome Date GA Lbr Agusto/2nd Weight Sex Delivery Anes PTL Lv   2 Para            1 Para              Past Medical History:   Diagnosis Date    Allergic rhinitis     Cervical cancer (HCC)     History of squamous cell carcinoma of the cervix    Chronic LBP     evaluated in past by Mikey Bush/Sis/Katiana/Alvaro, chiropractor.  1.) Evaluation with injections by Dr Butler Given 2.) MRI,   3) MRI,  significant foraminal stenosis at L5-S1 on the right and stenosis of foramen, left greater than the right, at L4-L5. 4.) Dr Brian Vargas evaluation, . 5.) Dr Vnii nieves with injection x1, benefitted her in     CTS (carpal tunnel syndrome)     LEFT    Degeneration of lumbar intervertebral disc 01/10/2016    [facility] L2/3, L4/L5, L5/S1, rosalind injections, MRI   Dr Hernández Brittany    Diverticulosis     on colonoscopy     DVT (deep venous thrombosis) ([facility] [address] 09/03    1.)Factor V Leiden mutation, heterozygous 2.) Hyperhomocysteinemia 3.) Dr Pepper nieves recommending lifelong anticoagulation. 4.) Post-phlebitic syndrome on right. Dr Leia nieves 2012 rec continued permanent anticoag. Fatty infiltration of liver     Noted on ultrasound September 2014. Discussed weight loss. GERD (gastroesophageal reflux disease)     EGD, 01/03, repeat egd egd 7/14 Dr Lima Gutierrez. Esophagitis    Glaucoma     Dr Dai Delgadillo    Hemorrhoids     on colonoscopy 08/11    Hyperhomocysteinemia ([facility] [address]     Hyperlipidemia     Hypertension     1.) Admit to [facility], 05/05, with hypertensive urgency with some right facial tingling, negative CT, negative MRI, 2.) 24 hr urine cortisol normal, 02/07, 24 hour urine metanephrine normal, 02/07 3.) MRI of renal arteries okay, 05/08    Hypomagnesemia     IBS (irritable bowel syndrome)     history of    IFG (impaired fasting glucose)     Migraine headache     evaluated by Dr Jerica Horvath and Dr Raquel Collado in past. Failed Zomig, Imitrex, Maxalt, multiple NSAIDs, and Fioricet. Failed amitriptyline secondary to intolerance. Mitral regurgitation     Mildly dilated left atrium and mild to moderate MR on echo, 01/11. 1.) Repeat echocardiogram, 06/12 with EF of 50-55%, left atrial enlargement, Mild MR   2) Echo 9/16  trivial MR, grade 1 allen dysfxn, RSVP 41    Non Hodgkin's lymphoma ([facility] [address] 10/01/2018    Obesity     Ptosis of right eyelid     MILD,CHRONIC    Sprain of lumbar region 1/10/2016    [facility]    Varicose veins     with sclerotherapy by Dr Cristhian Velazco                                                                   Past Surgical History:   Procedure Laterality Date    APPENDECTOMY      COLONOSCOPY  08/19/2011    Mild sigmoid diverticulosis. Internal hemorrhoids. Multiple sentinel piles.     HEEL SPUR SURGERY Right 1990    HYSTERECTOMY, TOTAL ABDOMINAL (CERVIX REMOVED)  1986    had squamous cell carcinoma in situ    KNEE ARTHROSCOPY Left 06/20/2017    [facility]    LYMPH NODE BIOPSY Left 07/14/2022    OTHER SURGICAL HISTORY  05/23/2013    Interlaminar epidural steroid injection L5-S1.    OTHER SURGICAL HISTORY  01/15/2016    right L5 TFE    SPINE SURGERY  05/23/2013    L5/S1 IESI    TOTAL KNEE ARTHROPLASTY Left 12/19/2017    Dr. Cantu Batch  01/31/2003    Tiny sliding hiatal hernia. Minimal antral erythema. No evidence of Franco's. VARICOSE VEIN SURGERY Left 08/2013    laser ablation     Family History   Problem Relation Age of Onset    Diabetes Mother     Cancer Mother         cervical    Cataracts Mother     Glaucoma Mother     Other Father         ruptured bowel and trouble with drinking    Cataracts Daughter         Factor V Leiden, homozygous    Cataracts Daughter         Factor V Leiden,  heterozygous    Heart Disease Neg Hx      Social History     Socioeconomic History    Marital status:      Spouse name: Not on file    Number of children: 2    Years of education: Not on file    Highest education level: Not on file   Occupational History     Employer: [employer]   Tobacco Use    Smoking status: Never    Smokeless tobacco: Never   Vaping Use    Vaping Use: Never used   Substance and Sexual Activity    Alcohol use: No    Drug use: No    Sexual activity: Yes     Partners: Male   Other Topics Concern    Not on file   Social History Narrative    Not on file     Social Determinants of Health     Financial Resource Strain: Not on file   Food Insecurity: Not on file   Transportation Needs: Not on file   Physical Activity: Not on file   Stress: Not on file   Social Connections: Not on file   Intimate Partner Violence: Not on file   Housing Stability: Not on file       MEDICATIONS:  Current Outpatient Medications   Medication Sig Dispense Refill    Cyanocobalamin 1000 MCG/ML KIT Inject as directed. Gets shot st dr Trung Diane      pregabalin (LYRICA) 100 MG capsule Take by mouth 3 times daily.       thiamine 100 MG tablet Take by mouth daily      lidocaine-prilocaine (EMLA) 2.5-2.5 % cream Apply to affected area once      allopurinol (ZYLOPRIM) 100 MG tablet Take 100 mg by mouth      warfarin (COUMADIN) 2 MG tablet TAKE 2 TABLETS DAILY AS INSTRUCTED      bimatoprost (LUMIGAN) 0.01 % SOLN ophthalmic drops Lumigan 0.01 % eye drops      latanoprost (XALATAN) 0.005 % ophthalmic solution Place 1 drop into both eyes nightly 3 Bottle 3    Elastic Bandages & Supports (MEDICAL COMPRESSION STOCKINGS) MISC 1 each by Does not apply route daily as needed (venous insuffiency) 20-30 mmHg 2 each 0    simvastatin (ZOCOR) 20 MG tablet TAKE 1 TABLET NIGHTLY 90 tablet 3    atenolol (TENORMIN) 100 MG tablet TAKE 1 TABLET DAILY 90 tablet 3    losartan-hydrochlorothiazide (HYZAAR) 100-25 MG per tablet Take 1 tablet by mouth daily 90 tablet 3    folic acid (FOLVITE) 1 MG tablet Take 2 mg by mouth daily. No current facility-administered medications for this visit. ALLERGIES:  Allergies as of 12/08/2022 - Fully Reviewed 12/08/2022   Allergen Reaction Noted    Gabapentin  04/28/2020    Lisinopril  08/12/2013    Mobic [meloxicam]  08/12/2013    Pregabalin  08/12/2013    Sulfa antibiotics  08/12/2013    Ultram [tramadol]  08/12/2013    Vicodin [hydrocodone-acetaminophen]  08/12/2013       Symptoms of decreased mood absent    **If either question is answered in a  positive fashion then complete the PHQ9 Scoring Evaluation and make the appropriate referral**      Immunization status: up to date and documented. Gynecologic History:  Menarche: 13 yo  Menopause at 43 yo     No LMP recorded. Patient has had a hysterectomy. Sexually Active: No    STD History: No     Permanent Sterilization: Yes hysterectomy   Reversible Birth Control: No        Hormone Replacement Exposure: No      Genetic Qualified Family History of Breast, Ovarian , Colon or Uterine Cancer: No     If YES see scanned worksheet.     Preventative Health Testing:    Health Maintenance:  Health Maintenance Due   Topic Date Due    Depression Screen  Never done    DTaP/Tdap/Td vaccine (1 - Tdap) 11/20/2013    Shingles vaccine (2 of 3) 04/16/2014    Lipids  07/12/2019    Colorectal Cancer Screen  08/19/2021    Breast cancer screen  09/03/2022       Date of Last Pap Smear: 2020  Abnormal Pap Smear History: squamous carcinoma in-situ  Colposcopy History:   Date of Last Mammogram: 2020  Date of Last Colonoscopy:   Date of Last Bone Density:      ________________________________________________________________________        REVIEW OF SYSTEMS:       A minimum of an eleven point review of systems was completed. Review Of Systems (11 point):  Constitutional: No fever, chills or malaise; No weight change or fatigue  Head and Eyes: No vision changes, Headache, Dizziness or trauma in last 12 months  ENT ROS: No hearing, Tinnitis, sinus or taste problems  Hematological and Lymphatic ROS:No Lymphoma, Von Willebrand's, Hemophillia or Bleeding History  Psych ROS: No Depression, Homicidal thoughts,suicidal thoughts, or anxiety  Breast ROS: No breast abnormalities or lumps  Respiratory ROS: No SOB, Pneumoniae,Cough, or Pulmonary Embolism   Cardiovascular ROS: No Chest Pain with Exertion, Palpitations, Syncope, Edema, Arrhythmia  Gastrointestinal ROS: No Indigestion, Heartburn, Nausea, vomiting, Diarrhea, Constipation,or Bowel Changes; No Bloody Stools or melena  Genito-Urinary ROS: No Dysuria, Hematuria or Nocturia.  No Urinary Incontinence or Vaginal Discharge  Musculoskeletal ROS: No Arthralgia, Arthritis,Gout,Osteoporosis or Rheumatism  Neurological ROS: No CVA, Migraines, Epilepsy, Seizure Hx, or Limb Weakness  Dermatological ROS: No Rash, Itching, Hives, Mole Changes or Cancer PHYSICAL Exam:     Constitutional:  Vitals:    12/08/22 1052   BP: 110/78   Site: Left Upper Arm   Position: Sitting   Pulse: 84   Weight: 243 lb 2 oz (110.3 kg)   Height: 5' 9\" (1.753 m)       Chaperone for Intimate Exam  Chaperone was offered and accepted as part of the rooming process. Chaperone: Christina          General Appearance: This  is a well Developed, well Nourished, well groomed female. Her BMI was reviewed. Nutritional decision making was discussed. Skin:  There was a Normal Inspection of the skin without rashes or lesions. There were no rashes. (Papular, Maculopapular, Hives, Pustular, Macular)     There were no lesions (Ulcers, Erythema, Abn. Appearing Nevi)            Lymphatic:  No Lymph Nodes were Palpable in the neck , axilla or groin. # Of Lymph Nodes; Location ; Character [Normal]  [Shotty] [Tender] [Enlarged]     Neck and EENT:  The neck was supple. There were no masses   The thyroid was not enlarged and had no masses. Perrla, EOMI B/L, TMI B/L No Abnormalities. Throat inspected-No exudates or Masses, Nares Patent No Masses        Respiratory: The lungs were auscultated and found to be clear. There were no rales, rhonchi or wheezes. There was a good respiratory effort. Cardiovascular: The heart was in a regular rate and rhythm. . No S3 or S4. There was no murmur appreciated. Location, grade, and radiation are not applicable. Extremities: The patients extremities were without calf tenderness, edema, or varicosities. There was full range of motion in all four extremities. Pulses in all four extremities were appreciated and are 2/4. Abdomen: The abdomen was soft and non-tender. There were good bowel sounds in all quadrants and there was no guarding, rebound or rigidity. On evaluation there was no evidence of hepatosplenomegaly and there was no costal vertebral blanca tenderness bilaterally. No hernias were appreciated. Abdominal Scars: none    Psych:   The patient had a normal Orientation to: Time, Place, Person, and Situation  There is no Mood / Affect changes    Breast:  (Chest)  normal appearance, no masses or tenderness  Self breast exams were reviewed in detail. Literature was given. Pelvic Exam:  External genitalia: normal general appearance  Urinary system: urethral meatus normal  Vaginal: normal mucosa without prolapse or lesions  Cervix: removed surgically  Adnexa: normal bimanual exam  Uterus: removed surgically    Rectal Exam:  exam declined by patient          Musculosk:  Normal Gait and station was noted. Digits were evaluated without abnormal findings. Range of motion, stability and strength were evaluated and found to be appropriate for the patients age. ASSESSMENT:      69 y.o. Annual   Diagnosis Orders   1. Well woman exam with routine gynecological exam        2. Screen for colon cancer  [facility], [place]      3. Menopause  DEXA AXIAL SKELETON W VERTEBRAL FX ASST      4. Encounter for screening mammogram for malignant neoplasm of breast  RONEL DAHIANA DIGITAL SCREEN BILATERAL      5. Screening for vaginal cancer  PAP SMEAR      6. History of squamous cell carcinoma in situ (SCCIS)        7. S/P complete hysterectomy               Chief Complaint   Patient presents with    Annual Exam          Past Medical History:   Diagnosis Date    Allergic rhinitis     Cervical cancer ([facility] [address]     History of squamous cell carcinoma of the cervix    Chronic LBP     evaluated in past by Drs Rachelle/Sis/Katiana/Alvaro, chiropractor.  1.) Evaluation with injections by Dr Britt Aguiar 2.) MRI, 11/08  3) MRI, 01/12 significant foraminal stenosis at L5-S1 on the right and stenosis of foramen, left greater than the right, at L4-L5. 4.) Dr Stacia Cameron evaluation, 2012. 5.) Dr Alisia nieves with injection x1, benefitted her in 2012    CTS (carpal tunnel syndrome)     LEFT    Degeneration of lumbar intervertebral disc 01/10/2016    [facility] L2/3, L4/L5, L5/S1, rosalind injections, MRI 12/16  Dr Joseph Cobian    Diverticulosis     on colonoscopy 08/11    DVT (deep venous thrombosis) ([facility] [address] 09/03    1.)Factor V Leiden mutation, heterozygous 2.) Hyperhomocysteinemia 3.) Dr Janeen nieves recommending lifelong anticoagulation. 4.) Post-phlebitic syndrome on right. Dr Chelsea nieves 2012 rec continued permanent anticoag. Fatty infiltration of liver     Noted on ultrasound September 2014. Discussed weight loss. GERD (gastroesophageal reflux disease)     EGD, 01/03, repeat egd egd 7/14 Dr Conklin Asp. Esophagitis    Glaucoma     Dr Juany Nolandmbmichael    Hemorrhoids     on colonoscopy 08/11    Hyperhomocysteinemia ([facility] [address]     Hyperlipidemia     Hypertension     1.) Admit to D, 05/05, with hypertensive urgency with some right facial tingling, negative CT, negative MRI, 2.) 24 hr urine cortisol normal, 02/07, 24 hour urine metanephrine normal, 02/07 3.) MRI of renal arteries okay, 05/08    Hypomagnesemia     IBS (irritable bowel syndrome)     history of    IFG (impaired fasting glucose)     Migraine headache     evaluated by Dr Rc Steele and Dr Tierney Desouza in past. Failed Zomig, Imitrex, Maxalt, multiple NSAIDs, and Fioricet. Failed amitriptyline secondary to intolerance.     Mitral regurgitation     Mildly dilated left atrium and mild to moderate MR on echo, 01/11. 1.) Repeat echocardiogram, 06/12 with EF of 50-55%, left atrial enlargement, Mild MR   2) Echo 9/16  trivial MR, grade 1 allen dysfxn, RSVP 41    Non Hodgkin's lymphoma ([facility] [address] 10/01/2018    Obesity     Ptosis of right eyelid     MILD,CHRONIC    Sprain of lumbar region 1/10/2016    [facility]    Varicose veins     with sclerotherapy by Dr Shanique Carty         Patient Active Problem List    Diagnosis Date Noted    Dermatochalasis of both upper eyelids 10/04/2018    Ocular hypertension, bilateral 06/18/2018    Combined forms of age-related cataract of both eyes 06/18/2018    Primary osteoarthritis of one knee 12/19/2016    Obesity (BMI 30-39.9) 09/06/2016    Dyslipidemia 03/03/2016    Sprain of lumbar region 01/10/2016     [facility]      Degeneration of lumbar intervertebral disc 01/10/2016     [facility] L2/3, L4/L5, L5/S1      Essential hypertension 09/03/2015    Hyperhomocysteinemia ([facility] [address]     Spider vein, symptomatic 12/12/2013    Chronic low back pain      evaluated in past by Drs Rachelle/Sis/Katiana/Alvaro, chiropractor. 1.) Evaluation with injections by Dr Coby Epstein 2.) MRI, 11/08  3) MRI, 01/12 significant foraminal stenosis at L5-S1 on the right and stenosis of foramen, left greater than the right, at L4-L5. 4.) Dr Justine Mcclain evaluation, 2012. 5.) Dr Jodie nieves with injection x1, benefitted her in 2012  replace inactive diagnosis      DVT (deep venous thrombosis) ([facility] [address]      1.)Factor V Leiden mutation, heterozygous 2.) Hyperhomocysteinemia 3.) Dr Jonny nieves recommending lifelong anticoagulation. 4.) Post-phlebitic syndrome on right. Dr Hardik nieves 2012 rec continued permanent anticoag. IFG (impaired fasting glucose)     Mitral regurgitation      Mildly dilated left atrium and mild to moderate MR on echo, 01/11. 1.) Repeat echocardiogram, 06/12 with EF of 50-55%, left atrial enlargement, Mild MR      GERD (gastroesophageal reflux disease)      EGD, 01/03      Venous insufficiency (chronic) (peripheral) 10/17/2013    Varicose vein 10/17/2013          Hereditary Breast, Ovarian, Colon and Uterine Cancer screening Done. Tobacco & Secondary smoke risks reviewed; instructed on cessation and avoidance      Counseling Completed:  Preventative Health Recommendations and Follow up. The patient was informed of the recommended preventative health recommendations. 1. Annuals every year; Cytology collections per prevailing guidelines. 2. Mammograms begin every year at 35 yo if no abnormalities are found and no family history. 3. Bone density studies every 2-3 years. Begin at 71 yo.  If no fracture history or osteoporosis family history. (significant). 4. Colonoscopy begin at 40 yo. Repeat every ten years if negative and no family history. 5. Calcium of 2230-1631 mg/day in split dosing  6. Vitamin D 400-800 IU/day  7. All other preventative health recommendations will be managed by the patients Primary care physician. PLAN:  Return in about 2 years (around 12/8/2024) for Annual Exam.  Repeat Annual every 1 year  Cervical Cytology Evaluation begins at 24years old. If Negative Cytology, Follow-up screening per current guidelines. Mammograms every 1 year. If 37 yo and last mammogram was negative. Calcium and Vitamin D dosing reviewed. Colonoscopy screening reviewed as well as onset for bone density testing. Birth control and barrier recommendations discussed. STD counseling and prevention reviewed. Gardisil counseling completed for all patients 10-37 yo. Routine health maintenance per patients PCP. Orders Placed This Encounter   Procedures    RONEL DAHIANA DIGITAL SCREEN BILATERAL     Standing Status:   Future     Standing Expiration Date:   6/6/2024    DEXA AXIAL SKELETON W VERTEBRAL FX ASST     Standing Status:   Future     Standing Expiration Date:   12/6/2023    PAP SMEAR     Standing Status:   Future     Standing Expiration Date:   2/6/2023     Order Specific Question:   Collection Type     Answer: Thin Prep     Order Specific Question:   Prior Abnormal Pap Test     Answer:   No     Order Specific Question:   Screening or Diagnostic     Answer:   Screening     Order Specific Question:   HPV Requested?      Answer:   Yes    [facility], [place]     Referral Priority:   Routine     Referral Type:   Eval and Treat     Referral Reason:   Specialty Services Required     Requested Specialty:   General Surgery     Number of Visits Requested:   1           The patient, Galen Aguayo is a 69 y.o. female, was seen with a total time spent of 30 minutes for the visit on this date of service by the MICHEAL/SAI provider. The time component had both face to face and non face to face time spent in determining the total time component. Counseling and education regarding her diagnosis listed below and her options regarding those diagnoses were also included in determining her time component. Diagnosis Orders   1. Well woman exam with routine gynecological exam        2. Screen for colon cancer  [facility], [place]      3. Menopause  DEXA AXIAL SKELETON W VERTEBRAL FX ASST      4. Encounter for screening mammogram for malignant neoplasm of breast  RONEL DAHIANA DIGITAL SCREEN BILATERAL      5. Screening for vaginal cancer  PAP SMEAR      6. History of squamous cell carcinoma in situ (SCCIS)        7. S/P complete hysterectomy             The patient had her preventative health maintenance recommendations and follow-up reviewed with her at the completion of her visit.

## 2022-12-11 LAB
HPV SAMPLE: NORMAL
HPV, GENOTYPE 16: NOT DETECTED
HPV, GENOTYPE 18: NOT DETECTED
HPV, HIGH RISK OTHER: NOT DETECTED
HPV, INTERPRETATION: NORMAL
SPECIMEN DESCRIPTION: NORMAL

## 2022-12-20 ENCOUNTER — HOSPITAL ENCOUNTER (OUTPATIENT)
Dept: MAMMOGRAPHY | Age: 67
Discharge: HOME | End: 2022-12-22
Payer: MEDICARE

## 2022-12-20 ENCOUNTER — HOSPITAL ENCOUNTER (OUTPATIENT)
Dept: BONE DENSITY | Age: 67
Discharge: HOME | End: 2022-12-22
Payer: MEDICARE

## 2022-12-20 DIAGNOSIS — Z78.0 MENOPAUSE: ICD-10-CM

## 2022-12-20 DIAGNOSIS — Z12.31 ENCOUNTER FOR SCREENING MAMMOGRAM FOR MALIGNANT NEOPLASM OF BREAST: ICD-10-CM

## 2022-12-20 PROCEDURE — 77085 DXA BONE DENSITY AXL VRT FX: CPT

## 2022-12-20 PROCEDURE — 77067 SCR MAMMO BI INCL CAD: CPT
